# Patient Record
Sex: FEMALE | Race: WHITE | NOT HISPANIC OR LATINO | URBAN - METROPOLITAN AREA
[De-identification: names, ages, dates, MRNs, and addresses within clinical notes are randomized per-mention and may not be internally consistent; named-entity substitution may affect disease eponyms.]

---

## 2018-08-03 ENCOUNTER — EMERGENCY (EMERGENCY)
Facility: HOSPITAL | Age: 83
LOS: 0 days | Discharge: HOME | End: 2018-08-03
Attending: EMERGENCY MEDICINE | Admitting: EMERGENCY MEDICINE

## 2018-08-03 VITALS
DIASTOLIC BLOOD PRESSURE: 74 MMHG | SYSTOLIC BLOOD PRESSURE: 186 MMHG | TEMPERATURE: 99 F | RESPIRATION RATE: 18 BRPM | OXYGEN SATURATION: 99 % | HEART RATE: 74 BPM
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
## 2018-08-03 DIAGNOSIS — I10 ESSENTIAL (PRIMARY) HYPERTENSION: ICD-10-CM

## 2018-08-03 DIAGNOSIS — E11.649 TYPE 2 DIABETES MELLITUS WITH HYPOGLYCEMIA WITHOUT COMA: ICD-10-CM

## 2018-08-03 DIAGNOSIS — R47.81 SLURRED SPEECH: ICD-10-CM
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
## 2018-08-03 DIAGNOSIS — Z79.84 LONG TERM (CURRENT) USE OF ORAL HYPOGLYCEMIC DRUGS: ICD-10-CM
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
## 2018-08-03 DIAGNOSIS — E78.5 HYPERLIPIDEMIA, UNSPECIFIED: ICD-10-CM

## 2018-08-03 DIAGNOSIS — Z88.8 ALLERGY STATUS TO OTHER DRUGS, MEDICAMENTS AND BIOLOGICAL SUBSTANCES: ICD-10-CM

## 2018-08-03 DIAGNOSIS — S50.312A ABRASION OF LEFT ELBOW, INITIAL ENCOUNTER: ICD-10-CM

## 2018-08-03 DIAGNOSIS — I25.10 ATHEROSCLEROTIC HEART DISEASE OF NATIVE CORONARY ARTERY WITHOUT ANGINA PECTORIS: ICD-10-CM
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
## 2018-08-03 LAB
ALBUMIN SERPL ELPH-MCNC: 4.4 G/DL — SIGNIFICANT CHANGE UP (ref 3.5–5.2)
ALP SERPL-CCNC: 62 U/L — SIGNIFICANT CHANGE UP (ref 30–115)
ALT FLD-CCNC: 6 U/L — SIGNIFICANT CHANGE UP (ref 0–41)
ANION GAP SERPL CALC-SCNC: 23 MMOL/L — HIGH (ref 7–14)
AST SERPL-CCNC: 13 U/L — SIGNIFICANT CHANGE UP (ref 0–41)
BASOPHILS # BLD AUTO: 0.03 K/UL — SIGNIFICANT CHANGE UP (ref 0–0.2)
BASOPHILS NFR BLD AUTO: 0.4 % — SIGNIFICANT CHANGE UP (ref 0–1)
BILIRUB SERPL-MCNC: 0.3 MG/DL — SIGNIFICANT CHANGE UP (ref 0.2–1.2)
BUN SERPL-MCNC: 36 MG/DL — HIGH (ref 10–20)
CALCIUM SERPL-MCNC: 9.8 MG/DL — SIGNIFICANT CHANGE UP (ref 8.5–10.1)
CHLORIDE SERPL-SCNC: 94 MMOL/L — LOW (ref 98–110)
CO2 SERPL-SCNC: 21 MMOL/L — SIGNIFICANT CHANGE UP (ref 17–32)
CREAT SERPL-MCNC: 1.5 MG/DL — SIGNIFICANT CHANGE UP (ref 0.7–1.5)
EOSINOPHIL # BLD AUTO: 0.03 K/UL — SIGNIFICANT CHANGE UP (ref 0–0.7)
EOSINOPHIL NFR BLD AUTO: 0.4 % — SIGNIFICANT CHANGE UP (ref 0–8)
GAS PNL BLDV: SIGNIFICANT CHANGE UP
GLUCOSE SERPL-MCNC: 40 MG/DL — CRITICAL LOW (ref 70–99)
HCT VFR BLD CALC: 27.3 % — LOW (ref 37–47)
HGB BLD-MCNC: 9.2 G/DL — LOW (ref 12–16)
IMM GRANULOCYTES NFR BLD AUTO: 0.7 % — HIGH (ref 0.1–0.3)
LYMPHOCYTES # BLD AUTO: 1.35 K/UL — SIGNIFICANT CHANGE UP (ref 1.2–3.4)
LYMPHOCYTES # BLD AUTO: 18.2 % — LOW (ref 20.5–51.1)
MCHC RBC-ENTMCNC: 31.7 PG — HIGH (ref 27–31)
MCHC RBC-ENTMCNC: 33.7 G/DL — SIGNIFICANT CHANGE UP (ref 32–37)
MCV RBC AUTO: 94.1 FL — SIGNIFICANT CHANGE UP (ref 81–99)
MONOCYTES # BLD AUTO: 0.67 K/UL — HIGH (ref 0.1–0.6)
MONOCYTES NFR BLD AUTO: 9 % — SIGNIFICANT CHANGE UP (ref 1.7–9.3)
NEUTROPHILS # BLD AUTO: 5.29 K/UL — SIGNIFICANT CHANGE UP (ref 1.4–6.5)
NEUTROPHILS NFR BLD AUTO: 71.3 % — SIGNIFICANT CHANGE UP (ref 42.2–75.2)
PLATELET # BLD AUTO: 238 K/UL — SIGNIFICANT CHANGE UP (ref 130–400)
POTASSIUM SERPL-MCNC: 4.7 MMOL/L — SIGNIFICANT CHANGE UP (ref 3.5–5)
POTASSIUM SERPL-SCNC: 4.7 MMOL/L — SIGNIFICANT CHANGE UP (ref 3.5–5)
PROT SERPL-MCNC: 7.5 G/DL — SIGNIFICANT CHANGE UP (ref 6–8)
RBC # BLD: 2.9 M/UL — LOW (ref 4.2–5.4)
RBC # FLD: 13.3 % — SIGNIFICANT CHANGE UP (ref 11.5–14.5)
SODIUM SERPL-SCNC: 138 MMOL/L — SIGNIFICANT CHANGE UP (ref 135–146)
TROPONIN T SERPL-MCNC: <0.01 NG/ML — SIGNIFICANT CHANGE UP
WBC # BLD: 7.42 K/UL — SIGNIFICANT CHANGE UP (ref 4.8–10.8)
WBC # FLD AUTO: 7.42 K/UL — SIGNIFICANT CHANGE UP (ref 4.8–10.8)

## 2018-08-03 RX ORDER — NABUMETONE 750 MG
2 TABLET ORAL
Qty: 0 | Refills: 0 | COMMUNITY

## 2018-08-03 RX ORDER — METOPROLOL TARTRATE 50 MG
1 TABLET ORAL
Qty: 0 | Refills: 0 | COMMUNITY

## 2018-08-03 RX ORDER — METFORMIN HYDROCHLORIDE 850 MG/1
1 TABLET ORAL
Qty: 0 | Refills: 0 | COMMUNITY

## 2018-08-03 RX ORDER — PANTOPRAZOLE SODIUM 20 MG/1
1 TABLET, DELAYED RELEASE ORAL
Qty: 0 | Refills: 0 | COMMUNITY

## 2018-08-03 RX ORDER — FUROSEMIDE 40 MG
1 TABLET ORAL
Qty: 0 | Refills: 0 | COMMUNITY

## 2018-08-03 RX ORDER — RANOLAZINE 500 MG/1
1 TABLET, FILM COATED, EXTENDED RELEASE ORAL
Qty: 0 | Refills: 0 | COMMUNITY

## 2018-08-03 RX ORDER — GEMFIBROZIL 600 MG
1 TABLET ORAL
Qty: 0 | Refills: 0 | COMMUNITY

## 2018-08-03 NOTE — ED ADULT NURSE NOTE - NSIMPLEMENTINTERV_GEN_ALL_ED
Implemented All Fall with Harm Risk Interventions:  [place] to call system. Call bell, personal items and telephone within reach. Instruct patient to call for assistance. Room bathroom lighting operational. Non-slip footwear when patient is off stretcher. Physically safe environment: no spills, clutter or unnecessary equipment. Stretcher in lowest position, wheels locked, appropriate side rails in place. Provide visual cue, wrist band, yellow gown, etc. Monitor gait and stability. Monitor for mental status changes and reorient to person, place, and time. Review medications for side effects contributing to fall risk. Reinforce activity limits and safety measures with patient and family. Provide visual clues: red socks.

## 2018-08-03 NOTE — ED ADULT NURSE NOTE - CHIEF COMPLAINT QUOTE
slurred speech, s/p fall out of bed this am, patient on baby aspirin, as per family members patient usually speaks clearly without any deficits.

## 2018-08-03 NOTE — ED PROVIDER NOTE - NS ED MD DISPO DISCHARGE
Patient Information     Patient Name MRN Sex Cheri Roper [id number] Female 2004      Nursing Note by Isis Robles at 2017  2:00 PM     Author:  Isis Robles Service:  (none) Author Type:  (none)     Filed:  2017  2:24 PM Encounter Date:  2017 Status:  Signed     :  Isis Robles            Patient presents to clinic with rash on right ankle x 5 days.  Isis Meng ....................  2017   2:09 PM            
Home

## 2018-08-03 NOTE — ED ADULT TRIAGE NOTE - CHIEF COMPLAINT QUOTE
slurred speech, s/p fall out of bed this am, patient on baby aspirin slurred speech, s/p fall out of bed this am, patient on baby aspirin, as per family members patient usually speaks clearly without any deficits.

## 2018-08-03 NOTE — ED PROVIDER NOTE - PHYSICAL EXAMINATION
Vital signs reviewed  GENERAL: Patient well appearing, NAD  HEAD: NCAT  EYES: PERRL, EOMI  ENT: MMM  NECK: Supple, non tender  RESPIRATORY: Normal respiratory effort. CTA B/L. No wheezing, rales, rhonchi  CARDIOVASCULAR: Regular rate and rhythm. No murmurs, rubs or gallops.  ABDOMEN: Soft. Nondistended. Nontender. No guarding or rebound.   MUSCULOSKELETAL/EXTREMITIES: Brisk cap refill. 2+ radial pulses. No leg edema. Normal ROM of extremities.   SKIN:  Warm and dry. Left elbow abrasion.   NEURO: AAOx3. Slurred speech but coherent. Answering questions appropriately. No facial droop. Moving all extremities, 5/5 strength. No drift.   PSYCHIATRIC: Cooperative. Affect appropriate.

## 2018-08-03 NOTE — ED PROVIDER NOTE - ATTENDING CONTRIBUTION TO CARE
87 yo F dm, htn, cad, hld, now with episode of hypoglycemia and slurred speech, FS 50's, given d50 , resolved. episode of falling off the bed. mechanical. no syncope, no preceding symptoms. no cp/sob/abd pain/back pain/fever/sob.  vss, nontoxic, well appearing, elderly female, nl speech, PERRL, EOMI, GCS 15, pink conj, anicteric, MMM, tongue midline, no jvd, neck supple, CTAB, RRR, equal radial pulses bilat, abd soft/nt/nd, + ventral hernia, no cva tend. no calves tend, no edema, no fnd. + abrasion to left forearm, no bony tenderness, FROM, slow gait but no ataxia, neg rhomberg, no pronator drift, no cerebellar dysfunction.  labs, imaging, reassess.

## 2018-08-03 NOTE — ED ADULT NURSE NOTE - OBJECTIVE STATEMENT
Pt  heard loud thud when found pt wedged between bed and wall. Pt also had slurred speech at time and EMS was called. Pt now A&Ox4.  Pt fs was 52 on arrival. D50 amp given.

## 2018-08-03 NOTE — ED PROVIDER NOTE - PROGRESS NOTE DETAILS
symptoms gone post glucose administration. food. labs, imaging. pending ct results. FS repeated 150. ate in the ED> stable.

## 2018-08-03 NOTE — ED PROVIDER NOTE - OBJECTIVE STATEMENT
85yo F with PMHx DM, CAD, DLD, HTN, p/w falling out of bed this am and slurred speech, found to be hypoglycemic. Per family, they hear a thud in the bedroom this am and a minute later found the patient on the floor. Pt states she slipped getting out of bed, fell on left side, denies head trauma, currently no c/o pain. No LOC, not on AC. At that time family noticed pt was slightly slurring her speech and called EMS. Currently pt has no complaints. Denies fever, headache, lightheadedness, CP, SOB, cough, nausea, vomiting, abd pain, dysuria, leg swelling.

## 2019-07-24 ENCOUNTER — OUTPATIENT (OUTPATIENT)
Dept: OUTPATIENT SERVICES | Facility: HOSPITAL | Age: 84
LOS: 1 days | Discharge: HOME | End: 2019-07-24

## 2019-07-24 DIAGNOSIS — E10.9 TYPE 1 DIABETES MELLITUS WITHOUT COMPLICATIONS: ICD-10-CM

## 2019-07-24 DIAGNOSIS — R30.0 DYSURIA: ICD-10-CM

## 2019-07-24 PROBLEM — E11.9 TYPE 2 DIABETES MELLITUS WITHOUT COMPLICATIONS: Chronic | Status: ACTIVE | Noted: 2018-08-03

## 2019-07-24 PROBLEM — I25.10 ATHEROSCLEROTIC HEART DISEASE OF NATIVE CORONARY ARTERY WITHOUT ANGINA PECTORIS: Chronic | Status: ACTIVE | Noted: 2018-08-03

## 2019-07-24 PROBLEM — I10 ESSENTIAL (PRIMARY) HYPERTENSION: Chronic | Status: ACTIVE | Noted: 2018-08-03

## 2019-07-24 PROBLEM — E78.5 HYPERLIPIDEMIA, UNSPECIFIED: Chronic | Status: ACTIVE | Noted: 2018-08-03

## 2019-07-26 ENCOUNTER — OUTPATIENT (OUTPATIENT)
Dept: OUTPATIENT SERVICES | Facility: HOSPITAL | Age: 84
LOS: 1 days | Discharge: HOME | End: 2019-07-26

## 2019-07-26 DIAGNOSIS — R53.81 OTHER MALAISE: ICD-10-CM

## 2019-07-29 ENCOUNTER — OUTPATIENT (OUTPATIENT)
Dept: OUTPATIENT SERVICES | Facility: HOSPITAL | Age: 84
LOS: 1 days | Discharge: HOME | End: 2019-07-29

## 2019-07-29 DIAGNOSIS — D64.9 ANEMIA, UNSPECIFIED: ICD-10-CM

## 2019-08-02 PROBLEM — Z00.00 ENCOUNTER FOR PREVENTIVE HEALTH EXAMINATION: Status: ACTIVE | Noted: 2019-08-02

## 2019-08-14 ENCOUNTER — OUTPATIENT (OUTPATIENT)
Dept: OUTPATIENT SERVICES | Facility: HOSPITAL | Age: 84
LOS: 1 days | Discharge: HOME | End: 2019-08-14

## 2019-08-14 DIAGNOSIS — D50.9 IRON DEFICIENCY ANEMIA, UNSPECIFIED: ICD-10-CM

## 2019-08-20 ENCOUNTER — LABORATORY RESULT (OUTPATIENT)
Age: 84
End: 2019-08-20

## 2019-08-20 ENCOUNTER — OUTPATIENT (OUTPATIENT)
Dept: OUTPATIENT SERVICES | Facility: HOSPITAL | Age: 84
LOS: 1 days | Discharge: HOME | End: 2019-08-20

## 2019-08-20 ENCOUNTER — APPOINTMENT (OUTPATIENT)
Dept: HEMATOLOGY ONCOLOGY | Facility: CLINIC | Age: 84
End: 2019-08-20
Payer: MEDICARE

## 2019-08-20 VITALS
HEIGHT: 62 IN | DIASTOLIC BLOOD PRESSURE: 84 MMHG | TEMPERATURE: 96.2 F | HEART RATE: 57 BPM | BODY MASS INDEX: 34.96 KG/M2 | RESPIRATION RATE: 16 BRPM | SYSTOLIC BLOOD PRESSURE: 200 MMHG | WEIGHT: 190 LBS

## 2019-08-20 DIAGNOSIS — I10 ESSENTIAL (PRIMARY) HYPERTENSION: ICD-10-CM

## 2019-08-20 DIAGNOSIS — Z87.891 PERSONAL HISTORY OF NICOTINE DEPENDENCE: ICD-10-CM

## 2019-08-20 DIAGNOSIS — E78.5 HYPERLIPIDEMIA, UNSPECIFIED: ICD-10-CM

## 2019-08-20 DIAGNOSIS — K57.90 DIVERTICULOSIS OF INTESTINE, PART UNSPECIFIED, W/OUT PERFORATION OR ABSCESS W/OUT BLEEDING: ICD-10-CM

## 2019-08-20 DIAGNOSIS — R53.83 OTHER FATIGUE: ICD-10-CM

## 2019-08-20 DIAGNOSIS — E11.9 TYPE 2 DIABETES MELLITUS W/OUT COMPLICATIONS: ICD-10-CM

## 2019-08-20 DIAGNOSIS — Z85.828 PERSONAL HISTORY OF OTHER MALIGNANT NEOPLASM OF SKIN: ICD-10-CM

## 2019-08-20 DIAGNOSIS — Z80.0 FAMILY HISTORY OF MALIGNANT NEOPLASM OF DIGESTIVE ORGANS: ICD-10-CM

## 2019-08-20 LAB
HCT VFR BLD CALC: 26.9 %
HGB BLD-MCNC: 8.8 G/DL
MCHC RBC-ENTMCNC: 32.7 G/DL
MCHC RBC-ENTMCNC: 32.7 PG
MCV RBC AUTO: 100 FL
PLATELET # BLD AUTO: 154 K/UL
PMV BLD: 9.7 FL
RBC # BLD: 2.69 M/UL
RBC # FLD: 13.8 %
WBC # FLD AUTO: 4.91 K/UL

## 2019-08-20 PROCEDURE — 99203 OFFICE O/P NEW LOW 30 MIN: CPT

## 2019-08-20 RX ORDER — FUROSEMIDE 20 MG/1
20 TABLET ORAL
Refills: 0 | Status: ACTIVE | COMMUNITY

## 2019-08-20 RX ORDER — METOPROLOL TARTRATE 25 MG/1
25 TABLET, FILM COATED ORAL
Refills: 0 | Status: ACTIVE | COMMUNITY

## 2019-08-20 RX ORDER — DICLOFENAC SODIUM 1 %
1 KIT TOPICAL
Refills: 0 | Status: ACTIVE | COMMUNITY

## 2019-08-20 RX ORDER — PIOGLITAZONE HYDROCHLORIDE 15 MG/1
15 TABLET ORAL
Refills: 0 | Status: ACTIVE | COMMUNITY

## 2019-08-20 RX ORDER — ACETAMINOPHEN 325 MG/1
325 TABLET ORAL
Refills: 0 | Status: ACTIVE | COMMUNITY

## 2019-08-20 RX ORDER — CICLOPIROX 80 MG/ML
8 SOLUTION TOPICAL
Refills: 0 | Status: ACTIVE | COMMUNITY

## 2019-08-20 RX ORDER — NEOMYCIN/POLYMYXIN B/DEXAMETHA 3.5-10K-.1
0.35-10000-0.1 OINTMENT (GRAM) OPHTHALMIC (EYE)
Refills: 0 | Status: ACTIVE | COMMUNITY

## 2019-08-20 RX ORDER — GLIPIZIDE 10 MG/1
10 TABLET ORAL
Refills: 0 | Status: ACTIVE | COMMUNITY

## 2019-08-20 RX ORDER — FERROUS FUM/VIT C/B12-IF/FOLIC 110-0.5MG
CAPSULE ORAL
Refills: 0 | Status: DISCONTINUED | COMMUNITY

## 2019-08-20 RX ORDER — INSULIN LISPRO 100 [IU]/ML
INJECTION, SOLUTION INTRAVENOUS; SUBCUTANEOUS
Refills: 0 | Status: ACTIVE | COMMUNITY

## 2019-08-20 RX ORDER — RANOLAZINE 500 MG/1
500 TABLET, FILM COATED, EXTENDED RELEASE ORAL
Refills: 0 | Status: ACTIVE | COMMUNITY

## 2019-08-20 RX ORDER — NABUMETONE 500 MG/1
500 TABLET, FILM COATED ORAL
Refills: 0 | Status: ACTIVE | COMMUNITY

## 2019-08-20 RX ORDER — PRAVASTATIN SODIUM 40 MG/1
40 TABLET ORAL
Refills: 0 | Status: ACTIVE | COMMUNITY

## 2019-08-20 NOTE — PHYSICAL EXAM
[Ambulatory and capable of all self care but unable to carry out any work activities] : Status 2- Ambulatory and capable of all self care but unable to carry out any work activities. Up and about more than 50% of waking hours [Obese] : obese [Normal] : grossly intact

## 2019-08-20 NOTE — REASON FOR VISIT
[Initial Consultation] : an initial consultation for [Blood Count Assessment] : blood count assessment [Family Member] : family member

## 2019-08-21 LAB
HCYS SERPL-MCNC: 23.5 UMOL/L
PCA AB SER QL IF: NORMAL

## 2019-08-21 NOTE — ASSESSMENT
[FreeTextEntry1] : 87 year old female with macrocytic anemia. \par Normal WBC and platelet count\par Known b12 deficiency. Stopped supplementation last year. \par \par Recommendation\par Anemia could be  multifactorial - \par Nutritional deficiency, anemia of chronic disease vs bone marrow disoder such as MDS. \par B12 is 582. Will obtain MMA and homocysteine levels. If high will start supplementation with B12 injections. \par Will get parietal cell Ab and intrinsic factor ab. \par Colonoscopy was done in last 5 years was normal. Had Endoscopy as well but not sure of results. \par Since no symptoms of fatigue, chest pain, sob, palpitations, will not recommend prbc at this time\par \par RTC in one week to discuss results

## 2019-08-21 NOTE — HISTORY OF PRESENT ILLNESS
[de-identified] : 87 year old female with chronic kidney disease secondary to diabetes and HTN referred from [facility] for macrocytic anemia. \par She has been told she had anemia for last 5-8 years and was getting B12 injections. These were stopped last year and she does not recall why. \par She is NH for Physical Therapy. \par She feels well. Denies fatigue, SOB, chest pain, head ache, melena, bleeding per rectum. \par She had colonoscopy within 5 years and it was normal. \par lab work is as follows.\par \par CBC here showed HB of 8.8 with MCV of 100. \par B12 -580, Folate - 18.\par Iron studies -\par ferritin - 57, iron saturation -20%, TIBC -357.

## 2019-08-23 LAB
GASTRIN SERPL-MCNC: 20 PG/ML
IF BLOCK AB SER QL: NORMAL

## 2019-08-26 LAB — METHYLMALONATE SERPL-SCNC: 314 NMOL/L

## 2019-08-28 ENCOUNTER — APPOINTMENT (OUTPATIENT)
Dept: HEMATOLOGY ONCOLOGY | Facility: CLINIC | Age: 84
End: 2019-08-28
Payer: MEDICARE

## 2019-08-28 VITALS
BODY MASS INDEX: 34.6 KG/M2 | WEIGHT: 188 LBS | RESPIRATION RATE: 16 BRPM | HEIGHT: 62 IN | TEMPERATURE: 96.3 F | SYSTOLIC BLOOD PRESSURE: 180 MMHG | DIASTOLIC BLOOD PRESSURE: 78 MMHG | HEART RATE: 56 BPM

## 2019-08-28 PROCEDURE — 99213 OFFICE O/P EST LOW 20 MIN: CPT

## 2019-08-28 NOTE — HISTORY OF PRESENT ILLNESS
[de-identified] : 87 year old female with chronic kidney disease secondary to diabetes and HTN referred from [facility] for macrocytic anemia. \par She has been told she had anemia for last 5-8 years and was getting B12 injections. These were stopped last year and she does not recall why. \par She is [facility] for Physical Therapy. \par She feels well. Denies fatigue, SOB, chest pain, head ache, melena, bleeding per rectum. \par She had colonoscopy within 5 years and it was normal. \par lab work is as follows.\par \par CBC here showed HB of 8.8 with MCV of 100. \par B12 -580, Folate - 18.\par Iron studies -\par ferritin - 57, iron saturation -20%, TIBC -357.

## 2019-08-28 NOTE — REASON FOR VISIT
[Initial Consultation] : an initial consultation for [Family Member] : family member [Blood Count Assessment] : blood count assessment

## 2019-08-28 NOTE — CONSULT LETTER
[Dear  ___] : Dear  [unfilled], [Consult Letter:] : I had the pleasure of evaluating your patient, [unfilled]. [Please see my note below.] : Please see my note below. [Consult Closing:] : Thank you very much for allowing me to participate in the care of this patient.  If you have any questions, please do not hesitate to contact me. [Sincerely,] : Sincerely, [FreeTextEntry2] : Erum Aldana DO\par \par  [address], [place] [address]\par Phone: [phone]\par  [FreeTextEntry3] : Bill Muller DO\par Attending Physician,\par Hematology/ Medical Oncology\par [phone] office\par \par

## 2019-08-28 NOTE — ASSESSMENT
[FreeTextEntry1] : 87 year old female with macrocytic anemia. \par Normal WBC and platelet count\par Known b12 deficiency. Stopped supplementation last year. \par \par Recommendation\par Anemia could be  multifactorial - \par Homocystine is elevated and MMA is normal suggesting folic acid deficiency\par \par No objective evidence of low iron; unless NH records indicate low iron, recommend to stop oral iron\par \par Colonoscopy was done in last 5 years was normal. Had Endoscopy as well but not sure of results. \par Since no symptoms of fatigue, chest pain, sob, palpitations, will not recommend prbc at this time\par \par RTC in 6 weeks ; labs before the visit

## 2019-08-30 ENCOUNTER — OUTPATIENT (OUTPATIENT)
Dept: OUTPATIENT SERVICES | Facility: HOSPITAL | Age: 84
LOS: 1 days | Discharge: HOME | End: 2019-08-30

## 2019-08-30 DIAGNOSIS — R79.9 ABNORMAL FINDING OF BLOOD CHEMISTRY, UNSPECIFIED: ICD-10-CM

## 2019-09-17 ENCOUNTER — OUTPATIENT (OUTPATIENT)
Dept: OUTPATIENT SERVICES | Facility: HOSPITAL | Age: 84
LOS: 1 days | Discharge: HOME | End: 2019-09-17

## 2019-09-18 DIAGNOSIS — D64.9 ANEMIA, UNSPECIFIED: ICD-10-CM

## 2019-09-19 ENCOUNTER — APPOINTMENT (OUTPATIENT)
Dept: INFUSION THERAPY | Facility: CLINIC | Age: 84
End: 2019-09-19

## 2019-09-19 ENCOUNTER — OUTPATIENT (OUTPATIENT)
Dept: OUTPATIENT SERVICES | Facility: HOSPITAL | Age: 84
LOS: 1 days | Discharge: HOME | End: 2019-09-19

## 2019-09-19 DIAGNOSIS — R53.81 OTHER MALAISE: ICD-10-CM

## 2019-10-04 ENCOUNTER — OUTPATIENT (OUTPATIENT)
Dept: OUTPATIENT SERVICES | Facility: HOSPITAL | Age: 84
LOS: 1 days | Discharge: HOME | End: 2019-10-04

## 2019-10-05 DIAGNOSIS — R53.81 OTHER MALAISE: ICD-10-CM

## 2019-10-05 DIAGNOSIS — D64.9 ANEMIA, UNSPECIFIED: ICD-10-CM

## 2019-10-07 ENCOUNTER — OUTPATIENT (OUTPATIENT)
Dept: OUTPATIENT SERVICES | Facility: HOSPITAL | Age: 84
LOS: 1 days | Discharge: HOME | End: 2019-10-07

## 2019-10-07 DIAGNOSIS — R79.9 ABNORMAL FINDING OF BLOOD CHEMISTRY, UNSPECIFIED: ICD-10-CM

## 2019-10-08 ENCOUNTER — APPOINTMENT (OUTPATIENT)
Dept: HEMATOLOGY ONCOLOGY | Facility: CLINIC | Age: 84
End: 2019-10-08
Payer: MEDICARE

## 2019-10-08 ENCOUNTER — OUTPATIENT (OUTPATIENT)
Dept: OUTPATIENT SERVICES | Facility: HOSPITAL | Age: 84
LOS: 1 days | Discharge: HOME | End: 2019-10-08

## 2019-10-08 VITALS
WEIGHT: 183 LBS | HEIGHT: 62 IN | DIASTOLIC BLOOD PRESSURE: 70 MMHG | SYSTOLIC BLOOD PRESSURE: 149 MMHG | BODY MASS INDEX: 33.68 KG/M2 | TEMPERATURE: 96 F | HEART RATE: 55 BPM | RESPIRATION RATE: 16 BRPM

## 2019-10-08 DIAGNOSIS — D64.9 ANEMIA, UNSPECIFIED: ICD-10-CM

## 2019-10-08 PROCEDURE — 99213 OFFICE O/P EST LOW 20 MIN: CPT

## 2019-10-08 NOTE — CONSULT LETTER
[Dear  ___] : Dear  [unfilled], [Consult Letter:] : I had the pleasure of evaluating your patient, [unfilled]. [Please see my note below.] : Please see my note below. [Consult Closing:] : Thank you very much for allowing me to participate in the care of this patient.  If you have any questions, please do not hesitate to contact me. [Sincerely,] : Sincerely, [FreeTextEntry2] : Erum Aldana DO\par \par  [address], [place] [address]\par Phone: [phone]\par  [FreeTextEntry3] : Bill Muller DO\par Attending Physician,\par Hematology/ Medical Oncology\par [phone] office\par \par

## 2019-10-08 NOTE — HISTORY OF PRESENT ILLNESS
[de-identified] : 87 year old female with chronic kidney disease secondary to diabetes and HTN referred from [facility] for macrocytic anemia. \par She has been told she had anemia for last 5-8 years and was getting B12 injections. These were stopped last year and she does not recall why. \par She is NH for Physical Therapy. \par She feels well. Denies fatigue, SOB, chest pain, head ache, melena, bleeding per rectum. \par She had colonoscopy within 5 years and it was normal. \par lab work is as follows.\par \par CBC here showed HB of 8.8 with MCV of 100. \par B12 -580, Folate - 18.\par Iron studies -\par ferritin - 57, iron saturation -20%, TIBC -357.  [de-identified] : 10/8/2019: The patient is here for follow up on her anemia. Her last blood work from 10/4 showed a hb of 8.5 with MCV of 103.8/ Retic count 3% = 78.9/ WBC 3.8 with normal differential / EPO 12.8 . She has no complaints at this point except for fatigue\par

## 2019-10-08 NOTE — ASSESSMENT
[FreeTextEntry1] : # Macrocytic anemia with mild leukopenia . Retic count not adequately high\par - Known b12 deficiency from before,  on vitamin b12 oral\par - Homocystine is elevated and MMA is normal suggesting folic acid deficiency. on oral replacement\par - No evidence of MARIBELL on our labs . No need for oral iron. \par - Her anemia could be from her CKD . EPO level 12.8 . \par   Will give EPO [id number] weekly for 5 weeks and recheck her labs\par   If no response obtained, will consider the diagnosis of MDS . \par \par RTC in 2 months \par \par The patient was seen and examined with Dr Muller

## 2019-10-11 ENCOUNTER — APPOINTMENT (OUTPATIENT)
Dept: INFUSION THERAPY | Facility: CLINIC | Age: 84
End: 2019-10-11

## 2019-10-11 ENCOUNTER — OUTPATIENT (OUTPATIENT)
Dept: OUTPATIENT SERVICES | Facility: HOSPITAL | Age: 84
LOS: 1 days | Discharge: HOME | End: 2019-10-11

## 2019-10-11 DIAGNOSIS — R79.9 ABNORMAL FINDING OF BLOOD CHEMISTRY, UNSPECIFIED: ICD-10-CM

## 2019-10-17 ENCOUNTER — OUTPATIENT (OUTPATIENT)
Dept: OUTPATIENT SERVICES | Facility: HOSPITAL | Age: 84
LOS: 1 days | Discharge: HOME | End: 2019-10-17

## 2019-10-17 DIAGNOSIS — K74.60 UNSPECIFIED CIRRHOSIS OF LIVER: ICD-10-CM

## 2019-10-24 ENCOUNTER — OUTPATIENT (OUTPATIENT)
Dept: OUTPATIENT SERVICES | Facility: HOSPITAL | Age: 84
LOS: 1 days | Discharge: HOME | End: 2019-10-24

## 2019-10-24 DIAGNOSIS — D64.9 ANEMIA, UNSPECIFIED: ICD-10-CM

## 2019-10-26 ENCOUNTER — OUTPATIENT (OUTPATIENT)
Dept: OUTPATIENT SERVICES | Facility: HOSPITAL | Age: 84
LOS: 1 days | Discharge: HOME | End: 2019-10-26

## 2019-10-26 DIAGNOSIS — R10.9 UNSPECIFIED ABDOMINAL PAIN: ICD-10-CM

## 2019-10-31 ENCOUNTER — OUTPATIENT (OUTPATIENT)
Dept: OUTPATIENT SERVICES | Facility: HOSPITAL | Age: 84
LOS: 1 days | Discharge: HOME | End: 2019-10-31

## 2019-10-31 DIAGNOSIS — D64.9 ANEMIA, UNSPECIFIED: ICD-10-CM

## 2019-11-04 ENCOUNTER — OUTPATIENT (OUTPATIENT)
Dept: OUTPATIENT SERVICES | Facility: HOSPITAL | Age: 84
LOS: 1 days | Discharge: HOME | End: 2019-11-04

## 2019-11-04 DIAGNOSIS — R53.83 OTHER FATIGUE: ICD-10-CM

## 2019-12-27 ENCOUNTER — OUTPATIENT (OUTPATIENT)
Dept: OUTPATIENT SERVICES | Facility: HOSPITAL | Age: 84
LOS: 1 days | Discharge: HOME | End: 2019-12-27

## 2019-12-27 DIAGNOSIS — D64.9 ANEMIA, UNSPECIFIED: ICD-10-CM

## 2020-01-28 ENCOUNTER — OUTPATIENT (OUTPATIENT)
Dept: OUTPATIENT SERVICES | Facility: HOSPITAL | Age: 85
LOS: 1 days | Discharge: HOME | End: 2020-01-28

## 2020-01-28 ENCOUNTER — LABORATORY RESULT (OUTPATIENT)
Age: 85
End: 2020-01-28

## 2020-01-28 ENCOUNTER — APPOINTMENT (OUTPATIENT)
Dept: HEMATOLOGY ONCOLOGY | Facility: CLINIC | Age: 85
End: 2020-01-28
Payer: MEDICARE

## 2020-01-28 VITALS
RESPIRATION RATE: 16 BRPM | HEART RATE: 57 BPM | DIASTOLIC BLOOD PRESSURE: 72 MMHG | HEIGHT: 62 IN | TEMPERATURE: 97.4 F | SYSTOLIC BLOOD PRESSURE: 142 MMHG | BODY MASS INDEX: 32.94 KG/M2 | WEIGHT: 179 LBS

## 2020-01-28 LAB
HCT VFR BLD CALC: 29.3 %
HGB BLD-MCNC: 9.7 G/DL
MCHC RBC-ENTMCNC: 32.1 PG
MCHC RBC-ENTMCNC: 33.1 G/DL
MCV RBC AUTO: 97 FL
PLATELET # BLD AUTO: 173 K/UL
PMV BLD: 11.1 FL
RBC # BLD: 3.02 M/UL
RBC # FLD: 14.4 %
WBC # FLD AUTO: 5.29 K/UL

## 2020-01-28 PROCEDURE — 99213 OFFICE O/P EST LOW 20 MIN: CPT

## 2020-01-28 NOTE — ASSESSMENT
[FreeTextEntry1] : # Macrocytic anemia with mild leukopenia . Retic count not adequately high\par - Known b12 deficiency from before,  on vitamin b12 oral\par - Homocystine is elevated and MMA is normal suggesting folic acid deficiency. on oral replacement\par - No evidence of MARIBELL on our labs . No need for oral iron. \par - Her anemia could be from her CKD . EPO level 12.8 . \par   Will give EPO [id number] weekly for 5 weeks and recheck her labs\par \par labs today\par good response to procrit; then decfreased w/o procrit\par restart procrit 10,000 x5 and rtc after that

## 2020-01-28 NOTE — CONSULT LETTER
[Dear  ___] : Dear  [unfilled], [Consult Letter:] : I had the pleasure of evaluating your patient, [unfilled]. [Please see my note below.] : Please see my note below. [Consult Closing:] : Thank you very much for allowing me to participate in the care of this patient.  If you have any questions, please do not hesitate to contact me. [Sincerely,] : Sincerely, [FreeTextEntry2] : Erum Aldana DO\par \par  [address], [place] [address]\par Phone: [phone]\par  [FreeTextEntry3] : Bill Muller DO\par Attending Physician,\par Hematology/ Medical Oncology\par [phone] office\par \par

## 2020-01-31 DIAGNOSIS — D64.9 ANEMIA, UNSPECIFIED: ICD-10-CM

## 2020-06-23 ENCOUNTER — APPOINTMENT (OUTPATIENT)
Dept: HEMATOLOGY ONCOLOGY | Facility: CLINIC | Age: 85
End: 2020-06-23
Payer: MEDICARE

## 2020-06-23 VITALS
TEMPERATURE: 98.7 F | SYSTOLIC BLOOD PRESSURE: 151 MMHG | DIASTOLIC BLOOD PRESSURE: 62 MMHG | RESPIRATION RATE: 14 BRPM | HEART RATE: 54 BPM | HEIGHT: 62 IN | BODY MASS INDEX: 31.83 KG/M2 | WEIGHT: 173 LBS

## 2020-06-23 PROCEDURE — 99213 OFFICE O/P EST LOW 20 MIN: CPT

## 2020-06-23 NOTE — HISTORY OF PRESENT ILLNESS
[de-identified] : 10/8/2019: The patient is here for follow up on her anemia. Her last blood work from 10/4 showed a hb of 8.5 with MCV of 103.8/ Retic count 3% = 78.9/ WBC 3.8 with normal differential / EPO 12.8 . She has no complaints at this point except for fatigue\par \par 6/23/2020: The patient is here for follow up . SHe has no major complaints. She is on vitamin B12 and folate, and procrit 59308 units weekly . Her Hb 9.4 with MCV of 105 on 6/16 [de-identified] : 87 year old female with chronic kidney disease secondary to diabetes and HTN referred from [facility] for macrocytic anemia. \par She has been told she had anemia for last 5-8 years and was getting B12 injections. These were stopped last year and she does not recall why. \par She is NH for Physical Therapy. \par She feels well. Denies fatigue, SOB, chest pain, head ache, melena, bleeding per rectum. \par She had colonoscopy within 5 years and it was normal. \par lab work is as follows.\par \par CBC here showed HB of 8.8 with MCV of 100. \par B12 -580, Folate - 18.\par Iron studies -\par ferritin - 57, iron saturation -20%, TIBC -357.

## 2020-06-23 NOTE — PHYSICAL EXAM
[Obese] : obese [Ambulatory and capable of all self care but unable to carry out any work activities] : Status 2- Ambulatory and capable of all self care but unable to carry out any work activities. Up and about more than 50% of waking hours [Normal] : grossly intact

## 2020-06-23 NOTE — ASSESSMENT
[FreeTextEntry1] : # Macrocytic anemia Hb 9.4 with \par - Known b12 deficiency from before,  on vitamin b12 oral. Vitamin B12 normal 6/16/20\par - Homocystine is elevated and MMA is normal suggesting folic acid deficiency. on oral replacement. Folate level normal on 6/16\par - No evidence of MARIBELL on our labs . No need for oral iron. \par - Her anemia could be from her CKD . EPO level 12.8 . on procrit 67400 units weekly for the last week \par - Could be MDS . We discussed it with daughter ,  bone marrow biopsy will be deferred at this point, since her Hb is stable , and there is no transfusion requirements \par \par Plan: \par C/w procrit , target Hb of 10\par \par RTC in 2 months with labs\par

## 2020-06-23 NOTE — REASON FOR VISIT
[Blood Count Assessment] : blood count assessment [Family Member] : family member [Initial Consultation] : an initial consultation for

## 2020-06-23 NOTE — CONSULT LETTER
[Consult Letter:] : I had the pleasure of evaluating your patient, [unfilled]. [Dear  ___] : Dear  [unfilled], [Sincerely,] : Sincerely, [Consult Closing:] : Thank you very much for allowing me to participate in the care of this patient.  If you have any questions, please do not hesitate to contact me. [Please see my note below.] : Please see my note below. [FreeTextEntry3] : Bill Muller DO\par Attending Physician,\par Hematology/ Medical Oncology\par [phone] office\par \par  [FreeTextEntry2] : Erum Aldana DO\par \par  [address], [place] [address]\par Phone: [phone]\par

## 2020-09-23 ENCOUNTER — OUTPATIENT (OUTPATIENT)
Dept: OUTPATIENT SERVICES | Facility: HOSPITAL | Age: 85
LOS: 1 days | Discharge: HOME | End: 2020-09-23

## 2020-09-23 ENCOUNTER — LABORATORY RESULT (OUTPATIENT)
Age: 85
End: 2020-09-23

## 2020-09-23 ENCOUNTER — APPOINTMENT (OUTPATIENT)
Dept: HEMATOLOGY ONCOLOGY | Facility: CLINIC | Age: 85
End: 2020-09-23
Payer: MEDICARE

## 2020-09-23 VITALS
BODY MASS INDEX: 32.2 KG/M2 | SYSTOLIC BLOOD PRESSURE: 194 MMHG | RESPIRATION RATE: 14 BRPM | DIASTOLIC BLOOD PRESSURE: 77 MMHG | TEMPERATURE: 97.3 F | HEIGHT: 62 IN | WEIGHT: 175 LBS | HEART RATE: 61 BPM

## 2020-09-23 DIAGNOSIS — D64.9 ANEMIA, UNSPECIFIED: ICD-10-CM

## 2020-09-23 PROCEDURE — 99213 OFFICE O/P EST LOW 20 MIN: CPT

## 2020-09-23 NOTE — REASON FOR VISIT
[Blood Count Assessment] : blood count assessment [Family Member] : family member [Follow-Up Visit] : a follow-up visit for

## 2020-09-23 NOTE — HISTORY OF PRESENT ILLNESS
[de-identified] : 87 year old female with chronic kidney disease secondary to diabetes and HTN referred from [facility] for macrocytic anemia. \par She has been told she had anemia for last 5-8 years and was getting B12 injections. These were stopped last year and she does not recall why. \par She is NH for Physical Therapy. \par She feels well. Denies fatigue, SOB, chest pain, head ache, melena, bleeding per rectum. \par She had colonoscopy within 5 years and it was normal. \par lab work is as follows.\par \par CBC here showed HB of 8.8 with MCV of 100. \par B12 -580, Folate - 18.\par Iron studies -\par ferritin - 57, iron saturation -20%, TIBC -357.  [de-identified] : 10/8/2019: The patient is here for follow up on her anemia. Her last blood work from 10/4 showed a hb of 8.5 with MCV of 103.8/ Retic count 3% = 78.9/ WBC 3.8 with normal differential / EPO 12.8 . She has no complaints at this point except for fatigue\par \par 6/23/2020: The patient is here for follow up . SHe has no major complaints. She is on vitamin B12 and folate, and procrit 06111 units weekly . Her Hb 9.4 with MCV of 105 on 6/16\par \par 9/23/2020\par Patient is here for a follow-up visit for anemia, accompanied by her daughter.  She is feeling well with no new complaints.  Patient denies fever, chills, CP, dizziness nausea, vomiting, new pain or bleeding.  She is receiving Retacrit injections and B12 at [facility], as per daughter.  She ambulates with walker at facility.  There are no results available for review today, however they note that the labwork is closely monitored at the institution.

## 2020-09-23 NOTE — ASSESSMENT
[FreeTextEntry1] : # Macrocytic anemia Hb 9.4 with \par - Known b12 deficiency from before, on vitamin b12 oral. Vitamin B12 normal 6/16/20\par - Homocystine is elevated and MMA is normal suggesting folic acid deficiency. on oral replacement. Folate level normal on 6/16\par - No evidence of MARIBELL on our labs . No need for oral iron. \par - Her anemia could be from her CKD . EPO level 12.8 ; c/w procrit 49111 units weekly for the last week \par - Could be MDS . We discussed it with daughter ,  bone marrow biopsy will be deferred at this point, since her Hb is stable , and there is no transfusion requirements \par \par Plan: \par - CBC, BMP, LFTs, iron studies, ferritin today\par - C/w Retacrit 10,000 units SQ weekly, target Hb of 10g/dL\par \par RTC in 6 months with labs

## 2020-09-23 NOTE — CONSULT LETTER
[Dear  ___] : Dear  [unfilled], [Consult Letter:] : I had the pleasure of evaluating your patient, [unfilled]. [Please see my note below.] : Please see my note below. [Consult Closing:] : Thank you very much for allowing me to participate in the care of this patient.  If you have any questions, please do not hesitate to contact me. [Sincerely,] : Sincerely, [FreeTextEntry2] : Erum Aldana DO\par \par  [address], [place] [address]\par Phone: [phone]\par  [FreeTextEntry3] : Bill Muller DO\par Attending Physician,\par Hematology/ Medical Oncology\par [phone] office\par \par

## 2020-09-24 LAB
ALBUMIN SERPL ELPH-MCNC: 4.3 G/DL
ALP BLD-CCNC: 93 U/L
ALT SERPL-CCNC: 10 U/L
ANION GAP SERPL CALC-SCNC: 16 MMOL/L
AST SERPL-CCNC: 15 U/L
BILIRUB DIRECT SERPL-MCNC: <0.2 MG/DL
BILIRUB INDIRECT SERPL-MCNC: >0.2 MG/DL
BILIRUB SERPL-MCNC: 0.4 MG/DL
BUN SERPL-MCNC: 38 MG/DL
CALCIUM SERPL-MCNC: 9.5 MG/DL
CHLORIDE SERPL-SCNC: 101 MMOL/L
CO2 SERPL-SCNC: 20 MMOL/L
CREAT SERPL-MCNC: 1.4 MG/DL
FERRITIN SERPL-MCNC: 108 NG/ML
GLUCOSE SERPL-MCNC: 153 MG/DL
HCT VFR BLD CALC: 38.1 %
HGB BLD-MCNC: 12.1 G/DL
IRON SATN MFR SERPL: 12 %
IRON SERPL-MCNC: 48 UG/DL
MCHC RBC-ENTMCNC: 31.3 PG
MCHC RBC-ENTMCNC: 31.8 G/DL
MCV RBC AUTO: 98.4 FL
PLATELET # BLD AUTO: 206 K/UL
PMV BLD: 9.9 FL
POTASSIUM SERPL-SCNC: 4.9 MMOL/L
PROT SERPL-MCNC: 7.1 G/DL
RBC # BLD: 3.87 M/UL
RBC # FLD: 14.2 %
SODIUM SERPL-SCNC: 137 MMOL/L
TIBC SERPL-MCNC: 388 UG/DL
UIBC SERPL-MCNC: 340 UG/DL
WBC # FLD AUTO: 6.47 K/UL

## 2021-04-28 ENCOUNTER — APPOINTMENT (OUTPATIENT)
Dept: HEMATOLOGY ONCOLOGY | Facility: CLINIC | Age: 86
End: 2021-04-28

## 2022-11-10 ENCOUNTER — INPATIENT (INPATIENT)
Facility: HOSPITAL | Age: 87
LOS: 4 days | Discharge: NURSING, REHAB OR LONG-TERM CARE | End: 2022-11-15
Attending: STUDENT IN AN ORGANIZED HEALTH CARE EDUCATION/TRAINING PROGRAM | Admitting: STUDENT IN AN ORGANIZED HEALTH CARE EDUCATION/TRAINING PROGRAM

## 2022-11-10 VITALS
HEART RATE: 60 BPM | RESPIRATION RATE: 18 BRPM | TEMPERATURE: 98 F | SYSTOLIC BLOOD PRESSURE: 187 MMHG | DIASTOLIC BLOOD PRESSURE: 77 MMHG | OXYGEN SATURATION: 100 %

## 2022-11-10 LAB
ALBUMIN SERPL ELPH-MCNC: 4.2 G/DL — SIGNIFICANT CHANGE UP (ref 3.5–5.2)
ALP SERPL-CCNC: 84 U/L — SIGNIFICANT CHANGE UP (ref 30–115)
ALT FLD-CCNC: 9 U/L — SIGNIFICANT CHANGE UP (ref 0–41)
ANION GAP SERPL CALC-SCNC: 12 MMOL/L — SIGNIFICANT CHANGE UP (ref 7–14)
APPEARANCE UR: CLEAR — SIGNIFICANT CHANGE UP
APTT BLD: 30.4 SEC — SIGNIFICANT CHANGE UP (ref 27–39.2)
AST SERPL-CCNC: 19 U/L — SIGNIFICANT CHANGE UP (ref 0–41)
BASOPHILS # BLD AUTO: 0.04 K/UL — SIGNIFICANT CHANGE UP (ref 0–0.2)
BASOPHILS NFR BLD AUTO: 0.6 % — SIGNIFICANT CHANGE UP (ref 0–1)
BILIRUB SERPL-MCNC: 0.2 MG/DL — SIGNIFICANT CHANGE UP (ref 0.2–1.2)
BILIRUB UR-MCNC: NEGATIVE — SIGNIFICANT CHANGE UP
BLD GP AB SCN SERPL QL: SIGNIFICANT CHANGE UP
BUN SERPL-MCNC: 34 MG/DL — HIGH (ref 10–20)
CALCIUM SERPL-MCNC: 9 MG/DL — SIGNIFICANT CHANGE UP (ref 8.4–10.5)
CHLORIDE SERPL-SCNC: 99 MMOL/L — SIGNIFICANT CHANGE UP (ref 98–110)
CO2 SERPL-SCNC: 24 MMOL/L — SIGNIFICANT CHANGE UP (ref 17–32)
COLOR SPEC: SIGNIFICANT CHANGE UP
CREAT SERPL-MCNC: 1.2 MG/DL — SIGNIFICANT CHANGE UP (ref 0.7–1.5)
DIFF PNL FLD: ABNORMAL
EGFR: 43 ML/MIN/1.73M2 — LOW
EOSINOPHIL # BLD AUTO: 0.08 K/UL — SIGNIFICANT CHANGE UP (ref 0–0.7)
EOSINOPHIL NFR BLD AUTO: 1.2 % — SIGNIFICANT CHANGE UP (ref 0–8)
ETHANOL SERPL-MCNC: <10 MG/DL — SIGNIFICANT CHANGE UP
GLUCOSE SERPL-MCNC: 246 MG/DL — HIGH (ref 70–99)
GLUCOSE UR QL: NEGATIVE — SIGNIFICANT CHANGE UP
HCT VFR BLD CALC: 36.9 % — LOW (ref 37–47)
HGB BLD-MCNC: 12.1 G/DL — SIGNIFICANT CHANGE UP (ref 12–16)
IMM GRANULOCYTES NFR BLD AUTO: 1.6 % — HIGH (ref 0.1–0.3)
INR BLD: 1.06 RATIO — SIGNIFICANT CHANGE UP (ref 0.65–1.3)
KETONES UR-MCNC: NEGATIVE — SIGNIFICANT CHANGE UP
LACTATE SERPL-SCNC: 1.5 MMOL/L — SIGNIFICANT CHANGE UP (ref 0.7–2)
LEUKOCYTE ESTERASE UR-ACNC: ABNORMAL
LIDOCAIN IGE QN: 53 U/L — SIGNIFICANT CHANGE UP (ref 7–60)
LYMPHOCYTES # BLD AUTO: 0.93 K/UL — LOW (ref 1.2–3.4)
LYMPHOCYTES # BLD AUTO: 13.8 % — LOW (ref 20.5–51.1)
MCHC RBC-ENTMCNC: 32.8 G/DL — SIGNIFICANT CHANGE UP (ref 32–37)
MCHC RBC-ENTMCNC: 32.9 PG — HIGH (ref 27–31)
MCV RBC AUTO: 100.3 FL — HIGH (ref 81–99)
MONOCYTES # BLD AUTO: 0.63 K/UL — HIGH (ref 0.1–0.6)
MONOCYTES NFR BLD AUTO: 9.3 % — SIGNIFICANT CHANGE UP (ref 1.7–9.3)
NEUTROPHILS # BLD AUTO: 4.96 K/UL — SIGNIFICANT CHANGE UP (ref 1.4–6.5)
NEUTROPHILS NFR BLD AUTO: 73.5 % — SIGNIFICANT CHANGE UP (ref 42.2–75.2)
NITRITE UR-MCNC: NEGATIVE — SIGNIFICANT CHANGE UP
NRBC # BLD: 0 /100 WBCS — SIGNIFICANT CHANGE UP (ref 0–0)
PH UR: 7 — SIGNIFICANT CHANGE UP (ref 5–8)
PLATELET # BLD AUTO: 172 K/UL — SIGNIFICANT CHANGE UP (ref 130–400)
POTASSIUM SERPL-MCNC: 4.7 MMOL/L — SIGNIFICANT CHANGE UP (ref 3.5–5)
POTASSIUM SERPL-SCNC: 4.7 MMOL/L — SIGNIFICANT CHANGE UP (ref 3.5–5)
PROT SERPL-MCNC: 6.9 G/DL — SIGNIFICANT CHANGE UP (ref 6–8)
PROT UR-MCNC: ABNORMAL
PROTHROM AB SERPL-ACNC: 12.1 SEC — SIGNIFICANT CHANGE UP (ref 9.95–12.87)
RBC # BLD: 3.68 M/UL — LOW (ref 4.2–5.4)
RBC # FLD: 13.7 % — SIGNIFICANT CHANGE UP (ref 11.5–14.5)
SODIUM SERPL-SCNC: 135 MMOL/L — SIGNIFICANT CHANGE UP (ref 135–146)
SP GR SPEC: 1.03 — SIGNIFICANT CHANGE UP (ref 1.01–1.03)
UROBILINOGEN FLD QL: SIGNIFICANT CHANGE UP
WBC # BLD: 6.75 K/UL — SIGNIFICANT CHANGE UP (ref 4.8–10.8)
WBC # FLD AUTO: 6.75 K/UL — SIGNIFICANT CHANGE UP (ref 4.8–10.8)

## 2022-11-10 PROCEDURE — 71045 X-RAY EXAM CHEST 1 VIEW: CPT | Mod: 26

## 2022-11-10 PROCEDURE — 73564 X-RAY EXAM KNEE 4 OR MORE: CPT | Mod: 26,RT

## 2022-11-10 PROCEDURE — 74177 CT ABD & PELVIS W/CONTRAST: CPT | Mod: 26,MA

## 2022-11-10 PROCEDURE — 64447 NJX AA&/STRD FEMORAL NRV IMG: CPT

## 2022-11-10 PROCEDURE — 71260 CT THORAX DX C+: CPT | Mod: 26,MA

## 2022-11-10 PROCEDURE — 70450 CT HEAD/BRAIN W/O DYE: CPT | Mod: 26,MA

## 2022-11-10 PROCEDURE — 72125 CT NECK SPINE W/O DYE: CPT | Mod: 26,MA

## 2022-11-10 PROCEDURE — 99285 EMERGENCY DEPT VISIT HI MDM: CPT | Mod: 25

## 2022-11-10 PROCEDURE — 73552 X-RAY EXAM OF FEMUR 2/>: CPT | Mod: 26,RT

## 2022-11-10 PROCEDURE — 93010 ELECTROCARDIOGRAM REPORT: CPT

## 2022-11-10 PROCEDURE — 72170 X-RAY EXAM OF PELVIS: CPT | Mod: 26

## 2022-11-10 PROCEDURE — 76942 ECHO GUIDE FOR BIOPSY: CPT | Mod: 26

## 2022-11-10 RX ORDER — ONDANSETRON 8 MG/1
4 TABLET, FILM COATED ORAL ONCE
Refills: 0 | Status: COMPLETED | OUTPATIENT
Start: 2022-11-10 | End: 2022-11-10

## 2022-11-10 RX ORDER — TETANUS TOXOID, REDUCED DIPHTHERIA TOXOID AND ACELLULAR PERTUSSIS VACCINE, ADSORBED 5; 2.5; 8; 8; 2.5 [IU]/.5ML; [IU]/.5ML; UG/.5ML; UG/.5ML; UG/.5ML
0.5 SUSPENSION INTRAMUSCULAR ONCE
Refills: 0 | Status: COMPLETED | OUTPATIENT
Start: 2022-11-10 | End: 2022-11-10

## 2022-11-10 RX ADMIN — TETANUS TOXOID, REDUCED DIPHTHERIA TOXOID AND ACELLULAR PERTUSSIS VACCINE, ADSORBED 0.5 MILLILITER(S): 5; 2.5; 8; 8; 2.5 SUSPENSION INTRAMUSCULAR at 22:45

## 2022-11-10 NOTE — ED ADULT TRIAGE NOTE - SOURCE OF INFORMATION
OT ACUTE Treatment Session    Pt seen on 10 nursing unit.                                                          Frequency Comments: X [facility]     Admitting complaint:: Somnolence [R40.0]  Urinary tract infection without hematuria, site unspecified [N39.0]                                                                                         Precautions  Seizure Precautions: Yes (03/07/18 0835)  Other Precautions: Falls risk (03/02/18 0941)      ASSESSMENT:  Treatment today focused on dangling, simple command following, yes/no questions.   Current overall ADL status is total assist. Total assist provided for ADLs due to current medical status  Current functional mobility for ADL and Instrumental-ADL tasks is max assist for bed mobility and min-mod assist for sitting balance. Pt tolerates sitting at edge of bed about 10 minutes with min to mod assist due to poor balance utilizing LUE to hold to bed rail.  Pt communicates via thumbs up/down during sitting.  Pt able to answer simple yes/no questions with thumbs up/down.  Pt occasionally completes head nods. Follows simple 1 step commands  Patient  displays  fair progress demonstrated by this date's OT session.    Limitations at this time include weakness, fatigue, cognitive deficits, balance and medical status. Patient will benefit from further skilled OT for continued training with ADL's and functional mobility to help the patient meet goal of maximizing functional independence to promote a safe discharge to next level of care.  Discussed patient goal of increasing ADL independence, discharge planning options and therapy progress made to date with Patient.     Pt seen after being removed from hospice and code status changed to DNR-MMS from comfort cares as pt with increased alertness and command following.  Collaborated with MD gibson: new orders.    RECOMMENDATIONS FOR DISCHARGE:  Recommendations for Discharge: OT: Post acute therapy (03/10/18 8016)    OT Identified  Barriers to Discharge: weakness, w/c dependent with assist from  needed     PT/OT Mobility Equipment for Discharge: To be determined (03/07/18 0835)       ICU Mobility Assesment (PERME):         PLAN: Continue skilled OT, including the following Treatment Interventions: ADL retraining;Functional transfer training;UE strengthening/ROM;Endurance training;Cognitive reorientation;Patient/Family training;Equipment eval/education;Neuro muscular reeducation;Fine motor coordination activities;Compensatory technique education (03/10/18 1601)   Treatment Plan for Next Session: dangle, simple command following, grooming tasks-may need an aid for mobility          EDUCATION:   On this date, the patient was educated on continued OT plan of care.    The response to education was: Needs reinforcement                                                    SUBJECTIVE: Patient's Personal Goal: does not state (03/10/18 1601)   Subjective: pt none verbal, able to answer simple yes/no question with thumbs up/down  (03/10/18 1601)  Subjective/Objective Comments: Chart reviewed.  RNKatty, approved of therapy session.  Pt returned to supine in bed with all items in reach and all needs meet. Bed alarm activated (03/10/18 1601)    OBJECTIVE:Basic Lines: O2;Campbell catheter (03/10/18 1601)  Complex Lines:  (none noted) (03/10/18 1601)  Safety Measures: Alarms (03/10/18 1601)    RN reported Olmos Fall Scale Score: 60       Last 24 hours of Functional Data     ADLs  Self Cares/ADL's  Footwear Assistance: Total Assist - Dependent (03/10/18 1601)  Lower Body Dressing Deficit: Don/doff R sock;Don/doff L sock (03/10/18 1601)    Household mobility  Household Mobility  Supine to Sit: Maximal Assist (Max) (03/10/18 1601)  Sit to Supine: Maximal Assist (Max) (03/10/18 1601)  Sitting - Static: Minimal Assist (Min) (03/10/18 1601)  Sitting - Dynamic: Moderate Assist (Mod) (03/10/18 1601)  Household Mobility Comments #1: Pt tolerates sitting at edge  of bed about 10 minutes with min to mod assist due to poor balance utilizing LUE to hold to bed rail.  Pt communicates via thumbs up/down during sitting  (03/10/18 1601)    Home Management       Tolerance  OT Activity Tolerance  Activity Tolerance: 1:2 Activity to rest (03/10/18 1601)  Activity Tolerance Comments: fair (03/10/18 1601)    Cognition       Patient's Personal Goal: does not state (03/10/18 1601)    Therapy Goals:    Goals  Short Term Goals to Be Reviewed On: 03/12/18 (03/05/18 1501)  Short Term Goals Are The Same as Discharge Goals: No (03/05/18 1501)  Goal Agreement: Patient agrees with goals and treatment plan (03/05/18 1501)  Grooming Short Term Goal 1: pt to wash face with minmal assist (03/05/18 1501)  Grooming Discharge Goal 1: two grooming tasks with set-up (03/05/18 1501)  Grooming Discharge Goal Progress 1: Outcome not met, continue to monitor (03/05/18 1501)  Dressing Short Term Goal 1: don/doff gown with maximal assist (03/05/18 1501)  Dressing Discharge Goal 1: upper body set-up (03/05/18 1501)  Dressing Discharge Goal Progress 1: Outcome not met, continue to monitor (03/05/18 1501)  Dressing Short Term Goal 2: doff hops socks maximal assist (03/05/18 1501)  Dressing Discharge Goal 2: lower body dressing minimal assist (03/05/18 1501)  Dressing Discharge Goal Progress 2: Outcome not met, continue to monitor (03/05/18 1501)  Toileting Short Term Goal 1: maximal assist (03/05/18 1501)  Toileting Discharge Goal 1: minimal assist (03/05/18 1501)  Home Setting Transfer Short Term Goal 1: toilet transfer maximal assist (03/05/18 1501)  Home Setting Transfer Discharge Goal 1: toilet transfer maximal assist (03/05/18 1501)        Total Treatment Time:  OT Time Spent: 40 minutes (03/10/18 1601)    See OT flowsheet for full details regarding the OT therapy provided.        EMS

## 2022-11-10 NOTE — CONSULT NOTE ADULT - SUBJECTIVE AND OBJECTIVE BOX
Orthopaedic Surgery Consult Note    89yo Female brought to ED from nursing home for right hip pain after mechanical fall. Patient states she was hurriedly attempting to assist her facility roommate and forgot to take her walker, resulting in a fall. Patient denies dizziness or lightheadedness. Patient endorses head strike, denies loss of consciousness. Pain localized to the right hip, right knee, and right forearm. Patient ambulates with walker for the past 2 years, and has chronic right knee pain 2/2 arthritis. Patient unsure of her medications/AC    PMH/PSH  ^FALL  Diabetes  CAD (coronary artery disease)  Hypertension  Hyperlipidemia    Medications    Home Medications:  Aspir 81 oral delayed release tablet: 1 tab(s) orally once a day (03 Aug 2018 10:41)  furosemide 20 mg oral tablet: 1 tab(s) orally once a day (03 Aug 2018 10:41)  gemfibrozil 600 mg oral tablet: 1 tab(s) orally 2 times a day (03 Aug 2018 10:41)  glipiZIDE 10 mg oral tablet: 1 tab(s) orally once a day (03 Aug 2018 10:41)  metFORMIN 500 mg oral tablet: 1 tab(s) orally 2 times a day (03 Aug 2018 10:41)  metoprolol tartrate 50 mg oral tablet: 1 tab(s) orally 2 times a day (03 Aug 2018 10:41)  nabumetone 750 mg oral tablet: 2 tab(s) orally once a day (03 Aug 2018 10:41)  pravastatin 40 mg oral tablet: 1 tab(s) orally once a day (03 Aug 2018 10:41)  Protonix 40 mg oral granule, delayed release: 1 each orally once a day (03 Aug 2018 10:41)  Ranexa 500 mg oral tablet, extended release: 1 tab(s) orally 2 times a day (03 Aug 2018 10:41)    Allergies  codeine (Unknown)    T(C): 36.4 (11-10-22 @ 19:35), Max: 36.4 (11-10-22 @ 19:35)  HR: 60 (11-10-22 @ 19:35) (60 - 60)  BP: 187/77 (11-10-22 @ 19:35) (187/77 - 187/77)  RR: 18 (11-10-22 @ 19:35) (18 - 18)  SpO2: 100% (11-10-22 @ 19:35) (100% - 100%)    P/E:  AOx3, NAD  Non-labored breathing    BLUE  Superficial abrasions to right forearm  No swelling/erythema/ecchymosis noted  No deformity/laceration  FAROM  TTP to RUE abrasion, no tenderness to bony palpation  Compartments soft and compressible, no pain w/ passive stretch of digits  SILT Ax/LABCN/R/M/U  AIN/PIN/U intact  Firing deltoid/biceps/triceps/wf/we/epl/fpl/fdp/io   Radial pulse 2+, cap refill <2    Pelvis: stable    RLE  Skin intact  No swelling/erythema/ecchymosis noted  No laceration/abrasion noted  ROM limited by pain  TTP to hip, no TTP to knee or to rest of extremity  Compartments soft and compressible, no pain w/ passive stretch of digits  SILT SP/DP/T/SURAL/  Firing TA/EHL/FHL/GS  DP pulse 2+, cap refill <2    LLE  Skin intact  No swelling/erythema/ecchymosis noted  No laceration/abrasion noted  FAROM  No TTP  Compartments soft and compressible, no pain w/ passive stretch of digits  SILT SP/DP/T/SURAL/  Firing TA/EHL/FHL/GS  DP pulse 2+, cap refill <2    Labs                        12.1   6.75  )-----------( 172      ( 10 Nov 2022 20:22 )             36.9     11-10    135  |  99  |  34<H>  ----------------------------<  246<H>  4.7   |  24  |  1.2    Ca    9.0      10 Nov 2022 20:22    TPro  6.9  /  Alb  4.2  /  TBili  0.2  /  DBili  x   /  AST  19  /  ALT  9   /  AlkPhos  84  11-10    LIVER FUNCTIONS - ( 10 Nov 2022 20:22 )  Alb: 4.2 g/dL / Pro: 6.9 g/dL / ALK PHOS: 84 U/L / ALT: 9 U/L / AST: 19 U/L / GGT: x           PT/INR - ( 10 Nov 2022 20:22 )   PT: 12.10 sec;   INR: 1.06 ratio         PTT - ( 10 Nov 2022 20:22 )  PTT:30.4 sec    Imaging: XR demonstrates displaced right femoral neck fracture    A/P:  89yo Female w/ right femoral neck fracture. Recommend admission and surgical management once patient is medically cleared/optimized, possible OR tomorrow, 11/11/22    Patient present with grandson; states patient's daughter, Karen, is HCP. tel. [phone]    Weight bearing: NWB RLE  Pain control  Diet: NPO  DVT ppx: SCD, 1 dose heparin before midnight if not contraindicated, please hold chemoppx after midnight in advance of surgery  Home medications:   Preop labs: bmp, cbc, coags, type and screen x2, ekg, cxr  IV fluid  Rapid COVID test

## 2022-11-10 NOTE — ED ADULT TRIAGE NOTE - CHIEF COMPLAINT QUOTE
pt miguel ángel from [facility] s/p trip and fall. pt sts she went to help a roommate and she forgot to grab her walker so she tripped and fell on her right side. denies any loc no blood thinners.

## 2022-11-10 NOTE — ED PROVIDER NOTE - NS ED ROS FT
GEN:  no fever, no chills, no generalized weakness  NEURO:  no headache, no dizziness  ENT: no sore throat, no runny nose  CV:  no chest pain, no palpitations  RESP:  no sob, no cough  GI:  no nausea, no vomiting, no abdominal pain, no diarrhea  :  no dysuria, no urinary frequency, no hematuria  MSK:  +right hip pain  SKIN:  no rash, no bruising

## 2022-11-10 NOTE — ED PROVIDER NOTE - PROGRESS NOTE DETAILS
CP: hip fracture noted on xrays. ortho consulted and to evaluate patient Authored by Sagrario Collins DO: Pt signed out to Dr. Hopper at this time pending final ct reads and disposition CP: trauma consult placed via code line. pending trauma evaluation after reviewing results.

## 2022-11-10 NOTE — ED PROVIDER NOTE - ATTENDING CONTRIBUTION TO CARE
89-year-old female past medical history diabetes, CAD, hyperlipidemia, hypertension presents to the emergency department from [facility] status post mechanical trip and fall on her right side.  Patient reports head trauma but no LOC.  Patient reporting constant moderate right-sided hip pain at this time.    +HT no loc, not on any AC. Recalls all events. No fever, chills, n/v, cp,  pleuritic cp, sob, palpitations, diaphoresis, cough, chest wall/rib pain, clavicular pain, ankle pain, knee pain, shoulder pain, wrist pain, no ha/lh/dizziness, numbness/tingling, neck pain/ stiffness, abd pain, urinary symptoms, edema, calf pain/swelling/erythema, sick contacts, recent travel. GCS 15.    On Exam:  Vital Signs: I have reviewed the initial vital signs.  Constitutional: WDWN in nad.  HEAD: No signs of basilar skull fracture.  Integumentary: +superficial skin tears noted to RUE   EYES: No periorbital swelling/ecchymoses. PERRL, EOM intact. No nystagmus. No subconjunctival hemorrhage.   ENT: MMM. No rhinorrhea/otorrhea. No epistaxis,  No septal hematoma. No mastoid ecchymoses. No intraoral bleeding, No loose or craked teeth, no active bleeding. No malocclusion. No TMJ pain.  NECK: Supple, non-tender, No palpable shelves or step-offs.  BACK: No spinous tenderness. No palpable shelves or step-offs.  Cardiovascular: RRR, radial pulses 2/4 b/l. dp and pt pulses 2/4/ b/l. No pain to palpation to chest wall.  Respiratory: BS present b/l, ctabl, no wheezing or crackles, no stridor. No pain to palpation to ribs b/l. No crepitus. No subq emphysema.   Gastrointestinal: Abdomen soft, nd, nt. no r/g.  Musculoskeletal: +shortened and rotated RLE , TTP over R hip, distal pulses intact.   Neurologic: GCS 15. CN II-XII intact, no facial droop or slurring of speech. 5/5 sensation intact throughout upper and lower extremities. (-) Pronator.

## 2022-11-10 NOTE — ED PROVIDER NOTE - CARE PLAN
1 Principal Discharge DX:	Fall   Principal Discharge DX:	Fall  Assessment and plan of treatment:	Plan- labs, pan ct scan, xrs, ekg, reassess.   Principal Discharge DX:	Fall  Assessment and plan of treatment:	Plan- labs, pan ct scan, xrs, ekg, reassess.  Secondary Diagnosis:	Fracture of femoral neck, right, closed  Secondary Diagnosis:	Vertebral compression fracture

## 2022-11-10 NOTE — ED PROVIDER NOTE - CLINICAL SUMMARY MEDICAL DECISION MAKING FREE TEXT BOX
Patient signed out to me from Dr. Collins.  Labs imaging reviewed.  Patient found to have compression fracture and hip fracture.  Discussed with neurosurgery, trauma and orthopedics.  Will admit for further evaluation.

## 2022-11-10 NOTE — ED ADULT TRIAGE NOTE - NURSING HOMES
[facility] and [facility], [facility]

## 2022-11-10 NOTE — ED PROVIDER NOTE - PHYSICAL EXAMINATION
CONSTITUTIONAL: Well-developed; well-nourished; in no acute distress.   SKIN: warm, dry, +2cm skin tear x2 right forearm  HEAD: Normocephalic; atraumatic.  EYES: no conjunctival injection. PERRLA. EOMI.   ENT: No nasal discharge; airway clear.  NECK: Supple; non tender. no midline or paraspinal tenderness.  BACK: no midline or paraspinal tenderness to palpation  CARD: S1, S2 normal; Regular rate and rhythm.   RESP: No wheezes, rales or rhonchi.  ABD: soft ntnd. BS+ in all 4 quadrants.  EXT: +right hip tenderness to palpation unable to range due to pain and RLE appears shortened and externally rotated  NEURO: Alert, oriented, grossly unremarkable.  PSYCH: Cooperative, appropriate.

## 2022-11-10 NOTE — ED PROVIDER NOTE - TOBACCO USE
Spoke with Lesly Xiong from [facility] she will fax over form for verification on tissue specimen that was received  I spoke with Sharmin from [facility] endoscopy suite she provided me with fax number [phone] to send over form  She will have her staff verify tissue specimen and fax back to number on form  No further questions  Never smoker

## 2022-11-10 NOTE — ED PROVIDER NOTE - OBJECTIVE STATEMENT
89 yo female, PMHx of DM, CAD, DLD, HTN, presents s/p mechanical fall onto her right side, complaining of right hip, non-radiating, constant, sharp, no alleviating factors, aggravated by movement, no associated symptoms, sudden onset 6:30 pm at NH when she was walking without her walker and she tripped. She also hit her head. Denies headache, LOC, AC use, nausea, vomiting, dizziness, chest pain, shortness of breath, abdominal pain.

## 2022-11-11 ENCOUNTER — RESULT REVIEW (OUTPATIENT)
Age: 87
End: 2022-11-11

## 2022-11-11 LAB
BACTERIA # UR AUTO: ABNORMAL
EPI CELLS # UR: 4 /HPF — SIGNIFICANT CHANGE UP (ref 0–5)
GLUCOSE BLDC GLUCOMTR-MCNC: 187 MG/DL — HIGH (ref 70–99)
GLUCOSE BLDC GLUCOMTR-MCNC: 215 MG/DL — HIGH (ref 70–99)
GLUCOSE BLDC GLUCOMTR-MCNC: 340 MG/DL — HIGH (ref 70–99)
HYALINE CASTS # UR AUTO: 0 /LPF — SIGNIFICANT CHANGE UP (ref 0–7)
RBC CASTS # UR COMP ASSIST: 3 /HPF — SIGNIFICANT CHANGE UP (ref 0–4)
WBC UR QL: 9 /HPF — HIGH (ref 0–5)

## 2022-11-11 PROCEDURE — 73502 X-RAY EXAM HIP UNI 2-3 VIEWS: CPT | Mod: 26,RT

## 2022-11-11 PROCEDURE — 93010 ELECTROCARDIOGRAM REPORT: CPT

## 2022-11-11 PROCEDURE — 88305 TISSUE EXAM BY PATHOLOGIST: CPT | Mod: 26

## 2022-11-11 PROCEDURE — 88311 DECALCIFY TISSUE: CPT | Mod: 26

## 2022-11-11 PROCEDURE — 99221 1ST HOSP IP/OBS SF/LOW 40: CPT | Mod: GC

## 2022-11-11 PROCEDURE — 99231 SBSQ HOSP IP/OBS SF/LOW 25: CPT

## 2022-11-11 RX ORDER — FUROSEMIDE 40 MG
20 TABLET ORAL DAILY
Refills: 0 | Status: DISCONTINUED | OUTPATIENT
Start: 2022-11-11 | End: 2022-11-15

## 2022-11-11 RX ORDER — CEFAZOLIN SODIUM 1 G
2000 VIAL (EA) INJECTION EVERY 8 HOURS
Refills: 0 | Status: DISCONTINUED | OUTPATIENT
Start: 2022-11-11 | End: 2022-11-11

## 2022-11-11 RX ORDER — INSULIN LISPRO 100/ML
VIAL (ML) SUBCUTANEOUS
Refills: 0 | Status: DISCONTINUED | OUTPATIENT
Start: 2022-11-11 | End: 2022-11-11

## 2022-11-11 RX ORDER — INSULIN LISPRO 100/ML
VIAL (ML) SUBCUTANEOUS
Refills: 0 | Status: DISCONTINUED | OUTPATIENT
Start: 2022-11-11 | End: 2022-11-15

## 2022-11-11 RX ORDER — CEFAZOLIN SODIUM 1 G
2000 VIAL (EA) INJECTION ONCE
Refills: 0 | Status: COMPLETED | OUTPATIENT
Start: 2022-11-12 | End: 2022-11-12

## 2022-11-11 RX ORDER — SENNA PLUS 8.6 MG/1
2 TABLET ORAL AT BEDTIME
Refills: 0 | Status: DISCONTINUED | OUTPATIENT
Start: 2022-11-11 | End: 2022-11-11

## 2022-11-11 RX ORDER — DEXTROSE 50 % IN WATER 50 %
12.5 SYRINGE (ML) INTRAVENOUS ONCE
Refills: 0 | Status: DISCONTINUED | OUTPATIENT
Start: 2022-11-11 | End: 2022-11-11

## 2022-11-11 RX ORDER — RANOLAZINE 500 MG/1
500 TABLET, FILM COATED, EXTENDED RELEASE ORAL
Refills: 0 | Status: DISCONTINUED | OUTPATIENT
Start: 2022-11-11 | End: 2022-11-11

## 2022-11-11 RX ORDER — FUROSEMIDE 40 MG
20 TABLET ORAL DAILY
Refills: 0 | Status: DISCONTINUED | OUTPATIENT
Start: 2022-11-11 | End: 2022-11-11

## 2022-11-11 RX ORDER — PANTOPRAZOLE SODIUM 20 MG/1
40 TABLET, DELAYED RELEASE ORAL DAILY
Refills: 0 | Status: DISCONTINUED | OUTPATIENT
Start: 2022-11-11 | End: 2022-11-15

## 2022-11-11 RX ORDER — CEFTRIAXONE 500 MG/1
1000 INJECTION, POWDER, FOR SOLUTION INTRAMUSCULAR; INTRAVENOUS EVERY 24 HOURS
Refills: 0 | Status: DISCONTINUED | OUTPATIENT
Start: 2022-11-11 | End: 2022-11-11

## 2022-11-11 RX ORDER — GLUCAGON INJECTION, SOLUTION 0.5 MG/.1ML
1 INJECTION, SOLUTION SUBCUTANEOUS ONCE
Refills: 0 | Status: DISCONTINUED | OUTPATIENT
Start: 2022-11-11 | End: 2022-11-15

## 2022-11-11 RX ORDER — DEXTROSE 50 % IN WATER 50 %
12.5 SYRINGE (ML) INTRAVENOUS ONCE
Refills: 0 | Status: DISCONTINUED | OUTPATIENT
Start: 2022-11-11 | End: 2022-11-15

## 2022-11-11 RX ORDER — RANOLAZINE 500 MG/1
500 TABLET, FILM COATED, EXTENDED RELEASE ORAL
Refills: 0 | Status: DISCONTINUED | OUTPATIENT
Start: 2022-11-11 | End: 2022-11-15

## 2022-11-11 RX ORDER — ACETAMINOPHEN 500 MG
650 TABLET ORAL EVERY 6 HOURS
Refills: 0 | Status: DISCONTINUED | OUTPATIENT
Start: 2022-11-11 | End: 2022-11-15

## 2022-11-11 RX ORDER — METOPROLOL TARTRATE 50 MG
50 TABLET ORAL
Refills: 0 | Status: DISCONTINUED | OUTPATIENT
Start: 2022-11-11 | End: 2022-11-11

## 2022-11-11 RX ORDER — ASPIRIN/CALCIUM CARB/MAGNESIUM 324 MG
81 TABLET ORAL DAILY
Refills: 0 | Status: DISCONTINUED | OUTPATIENT
Start: 2022-11-11 | End: 2022-11-15

## 2022-11-11 RX ORDER — LIDOCAINE 4 G/100G
1 CREAM TOPICAL EVERY 24 HOURS
Refills: 0 | Status: DISCONTINUED | OUTPATIENT
Start: 2022-11-11 | End: 2022-11-15

## 2022-11-11 RX ORDER — METOPROLOL TARTRATE 50 MG
50 TABLET ORAL
Refills: 0 | Status: DISCONTINUED | OUTPATIENT
Start: 2022-11-11 | End: 2022-11-15

## 2022-11-11 RX ORDER — INSULIN LISPRO 100/ML
10 VIAL (ML) SUBCUTANEOUS ONCE
Refills: 0 | Status: COMPLETED | OUTPATIENT
Start: 2022-11-11 | End: 2022-11-11

## 2022-11-11 RX ORDER — GLUCAGON INJECTION, SOLUTION 0.5 MG/.1ML
1 INJECTION, SOLUTION SUBCUTANEOUS ONCE
Refills: 0 | Status: DISCONTINUED | OUTPATIENT
Start: 2022-11-11 | End: 2022-11-11

## 2022-11-11 RX ORDER — DEXTROSE 50 % IN WATER 50 %
25 SYRINGE (ML) INTRAVENOUS ONCE
Refills: 0 | Status: DISCONTINUED | OUTPATIENT
Start: 2022-11-11 | End: 2022-11-15

## 2022-11-11 RX ORDER — HEPARIN SODIUM 5000 [USP'U]/ML
5000 INJECTION INTRAVENOUS; SUBCUTANEOUS EVERY 8 HOURS
Refills: 0 | Status: DISCONTINUED | OUTPATIENT
Start: 2022-11-11 | End: 2022-11-11

## 2022-11-11 RX ORDER — SODIUM CHLORIDE 9 MG/ML
1000 INJECTION, SOLUTION INTRAVENOUS
Refills: 0 | Status: DISCONTINUED | OUTPATIENT
Start: 2022-11-11 | End: 2022-11-11

## 2022-11-11 RX ORDER — SENNA PLUS 8.6 MG/1
2 TABLET ORAL AT BEDTIME
Refills: 0 | Status: DISCONTINUED | OUTPATIENT
Start: 2022-11-11 | End: 2022-11-15

## 2022-11-11 RX ORDER — DEXTROSE 50 % IN WATER 50 %
15 SYRINGE (ML) INTRAVENOUS ONCE
Refills: 0 | Status: DISCONTINUED | OUTPATIENT
Start: 2022-11-11 | End: 2022-11-15

## 2022-11-11 RX ORDER — DEXTROSE 50 % IN WATER 50 %
25 SYRINGE (ML) INTRAVENOUS ONCE
Refills: 0 | Status: DISCONTINUED | OUTPATIENT
Start: 2022-11-11 | End: 2022-11-11

## 2022-11-11 RX ORDER — ASPIRIN/CALCIUM CARB/MAGNESIUM 324 MG
81 TABLET ORAL DAILY
Refills: 0 | Status: DISCONTINUED | OUTPATIENT
Start: 2022-11-11 | End: 2022-11-11

## 2022-11-11 RX ORDER — ACETAMINOPHEN 500 MG
650 TABLET ORAL EVERY 6 HOURS
Refills: 0 | Status: DISCONTINUED | OUTPATIENT
Start: 2022-11-11 | End: 2022-11-11

## 2022-11-11 RX ORDER — PANTOPRAZOLE SODIUM 20 MG/1
40 TABLET, DELAYED RELEASE ORAL DAILY
Refills: 0 | Status: DISCONTINUED | OUTPATIENT
Start: 2022-11-11 | End: 2022-11-11

## 2022-11-11 RX ORDER — DEXTROSE 50 % IN WATER 50 %
15 SYRINGE (ML) INTRAVENOUS ONCE
Refills: 0 | Status: DISCONTINUED | OUTPATIENT
Start: 2022-11-11 | End: 2022-11-11

## 2022-11-11 RX ORDER — HEPARIN SODIUM 5000 [USP'U]/ML
5000 INJECTION INTRAVENOUS; SUBCUTANEOUS EVERY 8 HOURS
Refills: 0 | Status: DISCONTINUED | OUTPATIENT
Start: 2022-11-12 | End: 2022-11-15

## 2022-11-11 RX ORDER — LIDOCAINE 4 G/100G
1 CREAM TOPICAL EVERY 24 HOURS
Refills: 0 | Status: DISCONTINUED | OUTPATIENT
Start: 2022-11-11 | End: 2022-11-11

## 2022-11-11 RX ORDER — ONDANSETRON 8 MG/1
4 TABLET, FILM COATED ORAL ONCE
Refills: 0 | Status: DISCONTINUED | OUTPATIENT
Start: 2022-11-11 | End: 2022-11-11

## 2022-11-11 RX ORDER — CEFAZOLIN SODIUM 1 G
2000 VIAL (EA) INJECTION ONCE
Refills: 0 | Status: COMPLETED | OUTPATIENT
Start: 2022-11-11 | End: 2022-11-11

## 2022-11-11 RX ADMIN — PANTOPRAZOLE SODIUM 40 MILLIGRAM(S): 20 TABLET, DELAYED RELEASE ORAL at 12:12

## 2022-11-11 RX ADMIN — RANOLAZINE 500 MILLIGRAM(S): 500 TABLET, FILM COATED, EXTENDED RELEASE ORAL at 06:23

## 2022-11-11 RX ADMIN — HEPARIN SODIUM 5000 UNIT(S): 5000 INJECTION INTRAVENOUS; SUBCUTANEOUS at 06:23

## 2022-11-11 RX ADMIN — Medication 650 MILLIGRAM(S): at 12:12

## 2022-11-11 RX ADMIN — Medication 50 MILLIGRAM(S): at 06:23

## 2022-11-11 RX ADMIN — Medication 6: at 08:47

## 2022-11-11 RX ADMIN — Medication 20 MILLIGRAM(S): at 06:23

## 2022-11-11 RX ADMIN — Medication 20 MILLIGRAM(S): at 19:29

## 2022-11-11 RX ADMIN — SENNA PLUS 2 TABLET(S): 8.6 TABLET ORAL at 21:24

## 2022-11-11 RX ADMIN — Medication 50 MILLIGRAM(S): at 19:29

## 2022-11-11 RX ADMIN — SODIUM CHLORIDE 75 MILLILITER(S): 9 INJECTION, SOLUTION INTRAVENOUS at 02:21

## 2022-11-11 RX ADMIN — Medication 10 UNIT(S): at 21:38

## 2022-11-11 RX ADMIN — Medication 81 MILLIGRAM(S): at 12:12

## 2022-11-11 RX ADMIN — RANOLAZINE 500 MILLIGRAM(S): 500 TABLET, FILM COATED, EXTENDED RELEASE ORAL at 19:29

## 2022-11-11 RX ADMIN — Medication 100 MILLIGRAM(S): at 22:54

## 2022-11-11 RX ADMIN — CEFTRIAXONE 100 MILLIGRAM(S): 500 INJECTION, POWDER, FOR SOLUTION INTRAMUSCULAR; INTRAVENOUS at 19:29

## 2022-11-11 RX ADMIN — Medication 650 MILLIGRAM(S): at 06:23

## 2022-11-11 NOTE — PRE-OP CHECKLIST - SURGICAL CONSENT
variance faxed/done variance faxed. Pt states she has difficulty seeing. alert and oriented x 4. Pt gives verbal consent for daugther to sign/done

## 2022-11-11 NOTE — PRE-OP CHECKLIST - NS PREOP CHK MONITOR ANESTHESIA CONSENT
Pt states she has difficulty seeing. alert and oriented x 4. Pt gives verbal consent for daugther to sign/done

## 2022-11-11 NOTE — CONSULT NOTE ADULT - SUBJECTIVE AND OBJECTIVE BOX
Neurosurgery Consult  Patient is a 89y old  Female who presents with a chief complaint of fall  HPI: 89 yo female, PMHx of DM, CAD, DLD, HTN, presents s/p mechanical fall onto her right side, complaining of right hip, non-radiating, constant, sharp, no alleviating factors, aggravated by movement, no associated symptoms, sudden onset 6:30 pm at NH when she was walking without her walker and she tripped. She also hit her head. Denies headache, LOC, AC use, nausea, vomiting, dizziness, chest pain, shortness of breath, abdominal pain. Trauma assessment in ED: ABCs intact , GCS 15 , AAOx3.   INTERVAL HX; Pt had a fall from a standing position onto right side breaking hip. Ortho schedule to repair hip today. CT sshows acute compression fracture of T12.    MECHANISM OF INJURY:  [] Blunt    [] MVC	 [x] Fall	 [] Pedestrian Struck	 [] Motorcycle    [] Assault    [] Bicycle collision   [] Sports injury   [] Penetrating   [] Gun Shot Wound     [] Stab Wound  GCS: 15 	E: 4	V: 5	M: 6   PAST MEDICAL & SURGICAL HISTORY: Diabetes CAD (coronary artery disease) Hypertension Hyperlipidemia    Allergies  codeine (Unknown)  Intolerances   Home Medications: Aspir 81 oral delayed release tablet: 1 tab(s) orally once a day (03 Aug 2018 10:41) furosemide 20 mg oral tablet: 1 tab(s) orally once a day (03 Aug 2018 10:41) gemfibrozil 600 mg oral tablet: 1 tab(s) orally 2 times a day (03 Aug 2018 10:41) glipiZIDE 10 mg oral tablet: 1 tab(s) orally once a day (03 Aug 2018 10:41) metFORMIN 500 mg oral tablet: 1 tab(s) orally 2 times a day (03 Aug 2018 10:41) metoprolol tartrate 50 mg oral tablet: 1 tab(s) orally 2 times a day (03 Aug 2018 10:41) nabumetone 750 mg oral tablet: 2 tab(s) orally once a day (03 Aug 2018 10:41) pravastatin 40 mg oral tablet: 1 tab(s) orally once a day (03 Aug 2018 10:41) Protonix 40 mg oral granule, delayed release: 1 each orally once a day (03 Aug 2018 10:41) Ranexa 500 mg oral tablet, extended release: 1 tab(s) orally 2 times a day (03 Aug 2018 10:41)   ROS: 10-system review is otherwise negative except HPI above.    Primary Survey:   A - airway intact B - bilateral breath sounds and good chest rise C - palpable pulses in all extremities D - GCS 15 on arrival, DONOVAN Exposure obtained  Vital Signs Last 24 Hrs T(C): 36.1 (2022 02:31), Max: 36.4 (10 Nov 2022 19:35) T(F): 96.9 (2022 02:31), Max: 97.6 (10 Nov 2022 19:35) HR: 74 (2022 02:) (60 - 74) BP: 166/66 (2022 02:) (166/66 - 187/77) BP(mean): 95 (2022 02:) (95 - 95) RR: 18 (2022 02:) (18 - 18) SpO2: 99% (:) (99% - 100%)  Parameters below as of 2022 02:31 Patient On (Oxygen Delivery Method): room air     (2022 04:40)   PAST MEDICAL & SURGICAL HISTORY: Diabetes CAD (coronary artery disease) Hypertension Hyperlipidemia     FAMILY HISTORY:   Social History: (-) x 3 Allergies  codeine (Unknown)      MEDICATIONS  (STANDING): acetaminophen     Tablet .. 650 milliGRAM(s) Oral every 6 hours aspirin enteric coated 81 milliGRAM(s) Oral daily cefTRIAXone   IVPB 1000 milliGRAM(s) IV Intermittent every 24 hours dextrose 5%. 1000 milliLiter(s) (100 mL/Hr) IV Continuous <Continuous> dextrose 5%. 1000 milliLiter(s) (50 mL/Hr) IV Continuous <Continuous> dextrose 50% Injectable 25 Gram(s) IV Push once dextrose 50% Injectable 12.5 Gram(s) IV Push once dextrose 50% Injectable 25 Gram(s) IV Push once furosemide    Tablet 20 milliGRAM(s) Oral daily glucagon  Injectable 1 milliGRAM(s) IntraMuscular once heparin   Injectable 5000 Unit(s) SubCutaneous every 8 hours insulin lispro (ADMELOG) corrective regimen sliding scale   SubCutaneous three times a day before meals lactated ringers. 1000 milliLiter(s) (75 mL/Hr) IV Continuous <Continuous> lidocaine   4% Patch 1 Patch Transdermal every 24 hours metoprolol tartrate 50 milliGRAM(s) Oral two times a day pantoprazole   Suspension 40 milliGRAM(s) Oral daily ranolazine 500 milliGRAM(s) Oral two times a day senna 2 Tablet(s) Oral at bedtime  MEDICATIONS  (PRN): dextrose Oral Gel 15 Gram(s) Oral once PRN Blood Glucose LESS THAN 70 milliGRAM(s)/deciliter   Review of systems:   Constitutional: No fever, weight loss or fatigue   Eyes: No eye pain or discharge ENMT:  No difficulty hearing; No sinus or throat pain Neck: No pain or stiffness Respiratory: No cough, wheezing, chills or hemoptysis Cardiovascular: No chest pain, palpitations, shortness of breath, dyspnea on exertion Gastrointestinal: No abdominal pain, nausea, vomiting or hematemesis; No diarrhea or constipation.  Genitourinary: No dysuria, frequency, hematuria or incontinence Neurological: As per HPI Skin: No rashes or lesions  Endocrine: No heat or cold intolerance; No hair loss Musculoskeletal: No joint pain or swelling Psychiatric: No depression, anxiety, mood swings Heme/Lymph: No easy bruising or bleeding gums  Vital Signs Last 24 Hrs T(C): 36.8 (2022 08:58), Max: 36.8 (2022 08:58) T(F): 98.2 (2022 08:58), Max: 98.2 (2022 08:58) HR: 63 (2022 08:58) (60 - 74) BP: 189/80 (2022 08:58) (166/66 - 189/80) BP(mean): 115 (2022 08:58) (95 - 115) RR: 18 (2022 08:58) (18 - 18) SpO2: 99% (2022 08:58) (99% - 100%)  Parameters below as of 2022 08:58 Patient On (Oxygen Delivery Method): room air    Neurologic Examination: General:  Appearance is consistent with chronologic age.  No abnormal facies.  General: The patient is oriented to person, place, time and date.  Recent and remote memory intact.  Fund of knowledge is intact and normal.  Language with normal repetition, comprehension and naming.  Nondysarthric.   Cranial nerves: intact VA, VFF.  EOMI w/o nystagmus, skew or reported double vision.  PERRL.  No ptosis/weakness of eyelid closure.  Facial sensation is normal with normal bite.  Motor examination:   Normal tone, bulk and range of motion with exception of RLE Formal Muscle Strength Testing: (MRC grade R/L) 5/5 UE; No observable drift.  LLE 5.5  RLE -broken hip Sensory examination:   Intact to light touch in all extremities.   .    Labs:  CBC Full  -  ( 10 Nov 2022 20:22 ) WBC Count : 6.75 K/uL RBC Count : 3.68 M/uL Hemoglobin : 12.1 g/dL Hematocrit : 36.9 % Platelet Count - Automated : 172 K/uL Mean Cell Volume : 100.3 fL Mean Cell Hemoglobin : 32.9 pg Mean Cell Hemoglobin Concentration : 32.8 g/dL Auto Neutrophil # : 4.96 K/uL Auto Lymphocyte # : 0.93 K/uL Auto Monocyte # : 0.63 K/uL Auto Eosinophil # : 0.08 K/uL Auto Basophil # : 0.04 K/uL Auto Neutrophil % : 73.5 % Auto Lymphocyte % : 13.8 % Auto Monocyte % : 9.3 % Auto Eosinophil % : 1.2 % Auto Basophil % : 0.6 %  1110  135  |  99  |  34<H> ----------------------------<  246<H> 4.7   |  24  |  1.2  Ca    9.0      10 Nov 2022 20:22  TPro  6.9  /  Alb  4.2  /  TBili  0.2  /  DBili  x   /  AST  19  /  ALT  9   /  AlkPhos  84  11-10  LIVER FUNCTIONS - ( 10 Nov 2022 20:22 ) Alb: 4.2 g/dL / Pro: 6.9 g/dL / ALK PHOS: 84 U/L / ALT: 9 U/L / AST: 19 U/L / GGT: x         PT/INR - ( 10 Nov 2022 20:22 )   PT: 12.10 sec;   INR: 1.06 ratio      PTT - ( 10 Nov 2022 20:22 )  PTT:30.4 sec Urinalysis Basic - ( 10 Nov 2022 22:56 )  Color: Light Yellow / Appearance: Clear / S.028 / pH: x Gluc: x / Ketone: Negative  / Bili: Negative / Urobili: <2 mg/dL  Blood: x / Protein: 30 mg/dL / Nitrite: Negative  Leuk Esterase: Large / RBC: 3 /HPF / WBC 9 /HPF  Sq Epi: x / Non Sq Epi: 4 /HPF / Bacteria: Many     Neuroimaging: NCHCT: CT Head No Cont:  ACC: [id number] EXAM:  CT CERVICAL SPINE                       ACC: [id number] EXAM:  CT BRAIN                        PROCEDURE DATE:  11/10/2022      INTERPRETATION:  Indication: Head injury. Neck injury. Trauma to neck.  Trauma to head.  Technique: CT head without IV contrast, and CT cervical spine without IV  contrast performed. Multiple axial CT images of the head were obtained  from the base of the skull to the vertex without the administration of IV  contrast. Subsequently, multiple axial CT images of the cervical spine  were obtained with coronal and sagittal reformats.  Comparison: CT head 8/3/2018.  Images of cervical spine degraded by motion.  CT head findings: Ventricular system, basal cisterns andsulcal patterns are symmetric and  appropriate for patient's age. No acute extra-axial collection.  No mass effect, midline shift or acute  hemorrhage is seen. Patchy hypodensities in the periventricular and subcortical white matter  withoutmass effect compatible with chronic microvascular changes. No depressed skull fracture. Paranasal sinuses and mastoid air cells are well-aerated.  CT cervical spine findings: No acute fracture.  Vertebral body heights grossly preserved.  No  spondylolisthesis. Dens is intact.  No prevertebral soft tissue swelling. Multilevel degenerative change.   BONES AND SOFT TISSUES: Bones appear diffusely osteopenic. There is an acute right femoral neck subcapital fracture with slight angulation and impaction. Acute appearing compression fracture of T12. No significant retropulsion is identified. Grade 1 anterolisthesis of L4 on L5. Respiratory motion limits evaluation for subtle sternal or rib fractures. Multiple chronic appearing bilateral anterior rib fractures are noted, sequelae of prior resuscitation.   IMPRESSION: Acute subcapital impacted and angulated right femoral neck fracture. Acute compression fracture of T12. No significant retropulsion identified. No evidence for acute traumatic solid organ injury. 3.7 cm left ovarian cyst. Continued ultrasound follow-up is recommended in this age group.  --- End of Report ---      ALIZE LARA MD; Attending Radiologist This document has been electronically signed. Nov 10 2022 11:00PM Notes  	   RAMU AHN MD; Attending Radiologist This document has been electronically signed. Nov 10 2022 10:01PM (11-10-22 @ 21:56)    Assessment:  This is a 89y Female with h/o htn, CAD, Hyperlidemia, DM pesents after a fall with compression fracture of t12,  in the setting of a right hip fracture   Plan: No acute intervention         Recommend TLSO or Abdominal brace when sitting upin bed         Obtain a MRI of T/L spine survey when pt is stable post op for further evaluation   Case discussed and films reviewed with         Neurosurgery Consult  Patient is a 89y old  Female who presents with a chief complaint of fall  HPI: 89 yo female, PMHx of DM, CAD, DLD, HTN, presents s/p mechanical fall onto her right side, complaining of right hip, non-radiating, constant, sharp, no alleviating factors, aggravated by movement, no associated symptoms, sudden onset 6:30 pm at NH when she was walking without her walker and she tripped. She also hit her head. Denies headache, LOC, AC use, nausea, vomiting, dizziness, chest pain, shortness of breath, abdominal pain. Trauma assessment in ED: ABCs intact , GCS 15 , AAOx3.   INTERVAL HX; Pt had a fall from a standing position onto right side breaking hip. Ortho schedule to repair hip today. CT sshows acute compression fracture of T12.    MECHANISM OF INJURY:  [] Blunt    [] MVC	 [x] Fall	 [] Pedestrian Struck	 [] Motorcycle    [] Assault    [] Bicycle collision   [] Sports injury   [] Penetrating   [] Gun Shot Wound     [] Stab Wound  GCS: 15 	E: 4	V: 5	M: 6   PAST MEDICAL & SURGICAL HISTORY: Diabetes CAD (coronary artery disease) Hypertension Hyperlipidemia    Allergies  codeine (Unknown)  Intolerances   Home Medications: Aspir 81 oral delayed release tablet: 1 tab(s) orally once a day (03 Aug 2018 10:41) furosemide 20 mg oral tablet: 1 tab(s) orally once a day (03 Aug 2018 10:41) gemfibrozil 600 mg oral tablet: 1 tab(s) orally 2 times a day (03 Aug 2018 10:41) glipiZIDE 10 mg oral tablet: 1 tab(s) orally once a day (03 Aug 2018 10:41) metFORMIN 500 mg oral tablet: 1 tab(s) orally 2 times a day (03 Aug 2018 10:41) metoprolol tartrate 50 mg oral tablet: 1 tab(s) orally 2 times a day (03 Aug 2018 10:41) nabumetone 750 mg oral tablet: 2 tab(s) orally once a day (03 Aug 2018 10:41) pravastatin 40 mg oral tablet: 1 tab(s) orally once a day (03 Aug 2018 10:41) Protonix 40 mg oral granule, delayed release: 1 each orally once a day (03 Aug 2018 10:41) Ranexa 500 mg oral tablet, extended release: 1 tab(s) orally 2 times a day (03 Aug 2018 10:41)   ROS: 10-system review is otherwise negative except HPI above.    Primary Survey:   A - airway intact B - bilateral breath sounds and good chest rise C - palpable pulses in all extremities D - GCS 15 on arrival, DONOVAN Exposure obtained  Vital Signs Last 24 Hrs T(C): 36.1 (2022 02:31), Max: 36.4 (10 Nov 2022 19:35) T(F): 96.9 (2022 02:31), Max: 97.6 (10 Nov 2022 19:35) HR: 74 (2022 02:) (60 - 74) BP: 166/66 (2022 02:) (166/66 - 187/77) BP(mean): 95 (2022 02:) (95 - 95) RR: 18 (2022 02:) (18 - 18) SpO2: 99% (:) (99% - 100%)  Parameters below as of 2022 02:31 Patient On (Oxygen Delivery Method): room air     (2022 04:40)   PAST MEDICAL & SURGICAL HISTORY: Diabetes CAD (coronary artery disease) Hypertension Hyperlipidemia     FAMILY HISTORY:   Social History: (-) x 3 Allergies  codeine (Unknown)      MEDICATIONS  (STANDING): acetaminophen     Tablet .. 650 milliGRAM(s) Oral every 6 hours aspirin enteric coated 81 milliGRAM(s) Oral daily cefTRIAXone   IVPB 1000 milliGRAM(s) IV Intermittent every 24 hours dextrose 5%. 1000 milliLiter(s) (100 mL/Hr) IV Continuous <Continuous> dextrose 5%. 1000 milliLiter(s) (50 mL/Hr) IV Continuous <Continuous> dextrose 50% Injectable 25 Gram(s) IV Push once dextrose 50% Injectable 12.5 Gram(s) IV Push once dextrose 50% Injectable 25 Gram(s) IV Push once furosemide    Tablet 20 milliGRAM(s) Oral daily glucagon  Injectable 1 milliGRAM(s) IntraMuscular once heparin   Injectable 5000 Unit(s) SubCutaneous every 8 hours insulin lispro (ADMELOG) corrective regimen sliding scale   SubCutaneous three times a day before meals lactated ringers. 1000 milliLiter(s) (75 mL/Hr) IV Continuous <Continuous> lidocaine   4% Patch 1 Patch Transdermal every 24 hours metoprolol tartrate 50 milliGRAM(s) Oral two times a day pantoprazole   Suspension 40 milliGRAM(s) Oral daily ranolazine 500 milliGRAM(s) Oral two times a day senna 2 Tablet(s) Oral at bedtime  MEDICATIONS  (PRN): dextrose Oral Gel 15 Gram(s) Oral once PRN Blood Glucose LESS THAN 70 milliGRAM(s)/deciliter   Review of systems:   Constitutional: No fever, weight loss or fatigue   Eyes: No eye pain or discharge ENMT:  No difficulty hearing; No sinus or throat pain Neck: No pain or stiffness Respiratory: No cough, wheezing, chills or hemoptysis Cardiovascular: No chest pain, palpitations, shortness of breath, dyspnea on exertion Gastrointestinal: No abdominal pain, nausea, vomiting or hematemesis; No diarrhea or constipation.  Genitourinary: No dysuria, frequency, hematuria or incontinence Neurological: As per HPI Skin: No rashes or lesions  Endocrine: No heat or cold intolerance; No hair loss Musculoskeletal: No joint pain or swelling Psychiatric: No depression, anxiety, mood swings Heme/Lymph: No easy bruising or bleeding gums  Vital Signs Last 24 Hrs T(C): 36.8 (2022 08:58), Max: 36.8 (2022 08:58) T(F): 98.2 (2022 08:58), Max: 98.2 (2022 08:58) HR: 63 (2022 08:58) (60 - 74) BP: 189/80 (2022 08:58) (166/66 - 189/80) BP(mean): 115 (2022 08:58) (95 - 115) RR: 18 (2022 08:58) (18 - 18) SpO2: 99% (2022 08:58) (99% - 100%)  Parameters below as of 2022 08:58 Patient On (Oxygen Delivery Method): room air    Neurologic Examination: General:  Appearance is consistent with chronologic age.  No abnormal facies.  General: The patient is oriented to person, place, time and date.  Recent and remote memory intact.  Fund of knowledge is intact and normal.  Language with normal repetition, comprehension and naming.  Nondysarthric.   Cranial nerves: intact VA, VFF.  EOMI w/o nystagmus, skew or reported double vision.  PERRL.  No ptosis/weakness of eyelid closure.  Facial sensation is normal with normal bite.  Motor examination:   Normal tone, bulk and range of motion with exception of RLE Formal Muscle Strength Testing: (MRC grade R/L) 5/5 UE; No observable drift.  LLE 5.5  RLE -broken hip Sensory examination:   Intact to light touch in all extremities.   .    Labs:  CBC Full  -  ( 10 Nov 2022 20:22 ) WBC Count : 6.75 K/uL RBC Count : 3.68 M/uL Hemoglobin : 12.1 g/dL Hematocrit : 36.9 % Platelet Count - Automated : 172 K/uL Mean Cell Volume : 100.3 fL Mean Cell Hemoglobin : 32.9 pg Mean Cell Hemoglobin Concentration : 32.8 g/dL Auto Neutrophil # : 4.96 K/uL Auto Lymphocyte # : 0.93 K/uL Auto Monocyte # : 0.63 K/uL Auto Eosinophil # : 0.08 K/uL Auto Basophil # : 0.04 K/uL Auto Neutrophil % : 73.5 % Auto Lymphocyte % : 13.8 % Auto Monocyte % : 9.3 % Auto Eosinophil % : 1.2 % Auto Basophil % : 0.6 %  1110  135  |  99  |  34<H> ----------------------------<  246<H> 4.7   |  24  |  1.2  Ca    9.0      10 Nov 2022 20:22  TPro  6.9  /  Alb  4.2  /  TBili  0.2  /  DBili  x   /  AST  19  /  ALT  9   /  AlkPhos  84  11-10  LIVER FUNCTIONS - ( 10 Nov 2022 20:22 ) Alb: 4.2 g/dL / Pro: 6.9 g/dL / ALK PHOS: 84 U/L / ALT: 9 U/L / AST: 19 U/L / GGT: x         PT/INR - ( 10 Nov 2022 20:22 )   PT: 12.10 sec;   INR: 1.06 ratio      PTT - ( 10 Nov 2022 20:22 )  PTT:30.4 sec Urinalysis Basic - ( 10 Nov 2022 22:56 )  Color: Light Yellow / Appearance: Clear / S.028 / pH: x Gluc: x / Ketone: Negative  / Bili: Negative / Urobili: <2 mg/dL  Blood: x / Protein: 30 mg/dL / Nitrite: Negative  Leuk Esterase: Large / RBC: 3 /HPF / WBC 9 /HPF  Sq Epi: x / Non Sq Epi: 4 /HPF / Bacteria: Many     Neuroimaging: NCHCT: CT Head No Cont:  ACC: [id number] EXAM:  CT CERVICAL SPINE                       ACC: [id number] EXAM:  CT BRAIN                        PROCEDURE DATE:  11/10/2022      INTERPRETATION:  Indication: Head injury. Neck injury. Trauma to neck.  Trauma to head.  Technique: CT head without IV contrast, and CT cervical spine without IV  contrast performed. Multiple axial CT images of the head were obtained  from the base of the skull to the vertex without the administration of IV  contrast. Subsequently, multiple axial CT images of the cervical spine  were obtained with coronal and sagittal reformats.  Comparison: CT head 8/3/2018.  Images of cervical spine degraded by motion.  CT head findings: Ventricular system, basal cisterns andsulcal patterns are symmetric and  appropriate for patient's age. No acute extra-axial collection.  No mass effect, midline shift or acute  hemorrhage is seen. Patchy hypodensities in the periventricular and subcortical white matter  withoutmass effect compatible with chronic microvascular changes. No depressed skull fracture. Paranasal sinuses and mastoid air cells are well-aerated.  CT cervical spine findings: No acute fracture.  Vertebral body heights grossly preserved.  No  spondylolisthesis. Dens is intact.  No prevertebral soft tissue swelling. Multilevel degenerative change.   BONES AND SOFT TISSUES: Bones appear diffusely osteopenic. There is an acute right femoral neck subcapital fracture with slight angulation and impaction. Acute appearing compression fracture of T12. No significant retropulsion is identified. Grade 1 anterolisthesis of L4 on L5. Respiratory motion limits evaluation for subtle sternal or rib fractures. Multiple chronic appearing bilateral anterior rib fractures are noted, sequelae of prior resuscitation.   IMPRESSION: Acute subcapital impacted and angulated right femoral neck fracture. Acute compression fracture of T12. No significant retropulsion identified. No evidence for acute traumatic solid organ injury. 3.7 cm left ovarian cyst. Continued ultrasound follow-up is recommended in this age group.  --- End of Report ---      ALIZE LARA MD; Attending Radiologist This document has been electronically signed. Nov 10 2022 11:00PM Notes  	   RAMU AHN MD; Attending Radiologist This document has been electronically signed. Nov 10 2022 10:01PM (11-10-22 @ 21:56)    Assessment:  This is a 89y Female with h/o htn, CAD, Hyperlidemia, DM pesents after a fall with compression fracture of t12,  in the setting of a right hip fracture   Plan: No acute intervention         Recommend TLSO or Abdominal brace when sitting up in bed         No contraindications for repair of hip fracture         Obtain a MRI of T/L spine survey when pt is stable post op for further evaluation   Case discussed and films reviewed with

## 2022-11-11 NOTE — H&P ADULT - ASSESSMENT
ASSESSMENT:  89F w/ PMH/PSH as above who presents s/p mechanical fall    Injuries identified:   - R Femoral neck fx  - T12 compression fx    PLAN:   - Admission to trauma, Floors  - NSGY consult for compression fx  - Ortho consult - plan for OR 11/11 pending cardiac clearance  - ISS and glucose control  - Preop for OR    Additional consultations:  - Neurosurgery  - Orthopedics  - PT/Rehab/SW  - Hospitalist/Medicine     Disposition pending results of above labs and imaging  Above plan discussed with Trauma attending, Dr. Jolley  , patient, patient family, and ED team  --------------------------------------------------------------------------------------  11-11-22 @ 04:40    TRAUMA SENIOR SPECTRA: [id number]  TRAUMA TEAM SPECTRA: [id number]

## 2022-11-11 NOTE — PHYSICAL THERAPY INITIAL EVALUATION ADULT - SPECIFY REASON(S)
Hold PT patient pending sx 11/11 for unstable hip fx as well as open NSG consult for t12 compression fx . Family  educated in rational for hold and PT plan , as well as supine therepeutic exercise

## 2022-11-11 NOTE — PATIENT PROFILE ADULT - FALL HARM RISK - HARM RISK INTERVENTIONS
Assistance with ambulation/Assistance OOB with selected safe patient handling equipment/Communicate Risk of Fall with Harm to all staff/Discuss with provider need for PT consult/Monitor gait and stability/Provide patient with walking aids - walker, cane, crutches/Reinforce activity limits and safety measures with patient and family/Sit up slowly, dangle for a short time, stand at bedside before walking/Tailored Fall Risk Interventions/Use of alarms - bed, chair and/or voice tab/Visual Cue: Yellow wristband and red socks/Bed in lowest position, wheels locked, appropriate side rails in place/Call bell, personal items and telephone in reach/Instruct patient to call for assistance before getting out of bed or chair/Non-slip footwear when patient is out of bed/[place] to call system/Physically safe environment - no spills, clutter or unnecessary equipment/Purposeful Proactive Rounding/Room/bathroom lighting operational, light cord in reach

## 2022-11-11 NOTE — PROGRESS NOTE ADULT - ASSESSMENT
89F w/ HTN, HLD, CAD, DM2 who presents s/p mechanical fall cb Right hip fracture and T12 compression fracture.    #Mechanical fall cb Right hip fracture and T12 compression fracture  Plan for OR today. Cardio risk stratification noted  - Post-op care per surgery team    #HTN/HLD  #CAD  - Cont lasix 20mg qD  - Cont metoprolol tartrate 50 BID  - Cont ASA 81mg qD  - Cont ranolazine 500 BID    #DM2  - Cont ISS    DVT PPX, heparin SC 89F w/ HTN, HLD, CAD, DM2 who presents s/p mechanical fall cb Right hip fracture and T12 compression fracture.    #Mechanical fall cb Right hip fracture and T12 compression fracture  Plan for OR today. Cardio risk stratification noted  - Per ACC Guidelines, pt would be elevated risk for moderate risk procedure. Further cardiac workup is not indicated.   - Post-op care per surgery team    #HTN/HLD  #CAD  - Cont lasix 20mg qD  - Cont metoprolol tartrate 50 BID  - Cont ASA 81mg qD  - Cont ranolazine 500 BID    #DM2  - Cont ISS    DVT PPX, heparin SC

## 2022-11-11 NOTE — H&P ADULT - ATTENDING COMMENTS
Seen and examined at 23:50 on 11/10.   Agree with above note. ABCs intact with GCS 15.   89 yo female s/p fall with - R Femoral neck fx,  T12 compression fx.  Plan for admission to floor, NSG and Ortho consults. Rest of plan as above.     Pamela Jolley MD

## 2022-11-11 NOTE — H&P ADULT - BIRTH SEX
----- Message from BRITNI Akbar sent at 7/31/2018  1:04 PM EDT -----  Start vitamin d 400 iu daily, call in # [phone] with 5 refills. Repeat tsh in 3 months. Continue current dose of synthroid.    Spoke with trents mom and called in med    Female

## 2022-11-11 NOTE — H&P ADULT - NSHPLABSRESULTS_GEN_ALL_CORE
Labs:  CAPILLARY BLOOD GLUCOSE      POCT Blood Glucose.: 232 mg/dL (10 Nov 2022 19:55)                          12.1   6.75  )-----------( 172      ( 10 Nov 2022 20:22 )             36.9       Auto Neutrophil %: 73.5 % (11-10-22 @ 20:22)  Auto Immature Granulocyte %: 1.6 % (11-10-22 @ 20:22)    11-10    135  |  99  |  34<H>  ----------------------------<  246<H>  4.7   |  24  |  1.2      Calcium, Total Serum: 9.0 mg/dL (11-10-22 @ 20:22)      LFTs:             6.9  | 0.2  | 19       ------------------[84      ( 10 Nov 2022 20:22 )  4.2  | x    | 9           Lipase:53     Amylase:x         Lactate, Blood: 1.5 mmol/L (11-10-22 @ 20:22)      Coags:     12.10  ----< 1.06    ( 10 Nov 2022 20:22 )     30.4              Alcohol, Blood: <10 mg/dL (11-10-22 @ 20:22)    Urinalysis Basic - ( 10 Nov 2022 22:56 )    Color: Light Yellow / Appearance: Clear / S.028 / pH: x  Gluc: x / Ketone: Negative  / Bili: Negative / Urobili: <2 mg/dL   Blood: x / Protein: 30 mg/dL / Nitrite: Negative   Leuk Esterase: Large / RBC: 3 /HPF / WBC 9 /HPF   Sq Epi: x / Non Sq Epi: 4 /HPF / Bacteria: Many            Alcohol, Blood: <10 mg/dL (11-10-22 @ 20:22)      RADIOLOGY & ADDITIONAL STUDIES:  ---------------------------------------------------------------------------------------  `< from: CT Head No Cont (11.10.22 @ 21:56) >  Impression:  CT head:  No evidence of acute intracranial abnormality.  CT cervical spine:  No evidence of acute fracture of the cervical spine.    < end of copied text >  < from: CT Chest w/ IV Cont (11.10.22 @ 22:23) >  IMPRESSION:    Acute subcapital impacted and angulated right femoral neck fracture.    Acute compression fracture of T12. No significant retropulsion identified.    No evidence for acute traumatic solid organ injury.    3.7 cm left ovarian cyst. Continued ultrasound follow-up is recommended   in this age group.    --- End of Report -    < end of copied text >

## 2022-11-11 NOTE — PRE PROCEDURE NOTE - PRE PROCEDURE EVALUATION
ORTHOPEDIC SURGERY PRE OP NOTE    Diagnosis: right femoral neck fracture    Planned Procedure: right hip hemiartrhoplasty    Consent: TO BE OBTAINED BY ATTENDING                   Risks/benefits/alternatives were discussed with the patient/family and they wish to proceed with surgery.       ANTICIPATED DATE OF PROCEDURE : 11/11/22  SCHEDULED CASE OR ADD-ON CASE: add-on        Clearances:   [ ] Surgery  [ ] Medicine    T(C): 36.4 (11-10-22 @ 19:35), Max: 36.4 (11-10-22 @ 19:35)  HR: 60 (11-10-22 @ 19:35) (60 - 60)  BP: 187/77 (11-10-22 @ 19:35) (187/77 - 187/77)  RR: 18 (11-10-22 @ 19:35) (18 - 18)  SpO2: 100% (11-10-22 @ 19:35) (100% - 100%)    Labs:                        12.1   6.75  )-----------( 172      ( 10 Nov 2022 20:22 )             36.9     11-10    135  |  99  |  34<H>  ----------------------------<  246<H>  4.7   |  24  |  1.2    Ca    9.0      10 Nov 2022 20:22    TPro  6.9  /  Alb  4.2  /  TBili  0.2  /  DBili  x   /  AST  19  /  ALT  9   /  AlkPhos  84  11-10    PT/INR - ( 10 Nov 2022 20:22 )   PT: 12.10 sec;   INR: 1.06 ratio         PTT - ( 10 Nov 2022 20:22 )  PTT:30.4 sec  Type and Screen X 2:    [x]EKG:   [x]CXR:     DIET: NPO after midnight  IVF: per primary team    ANTICOAGULATION STATUS ( include name of anticoagulant) :  hold DVT chemoppx on day of surgery                                         A/P: Patient is a 89y y/o Female Pending right hip hemiarthroplasty 11/11/22    [ ] -NPO and IVF @ midnight  [ ]pain control/analgesia prn per primary team   [ ]Incentive Spirometry   [ ]F/U Clearance  [ ]F/U Pending Labs  [ ]Notify Ortho with any questions- spectra [id number]    [ ]DISCUSSED WITH PRIMARY TEAM MEMBER (name of team member): Dr Palma  [ ]Date and Time DISCUSSED WITH PRIMARY TEAM MEMBER: 11/10/22 1159pm

## 2022-11-11 NOTE — PROGRESS NOTE ADULT - ASSESSMENT
s/p right hip jovita pod 0     pain control   dvt proph   needs 1 month post op   abx 24 hours   am labs   wbat with walker   incentive   pt rehab   medical management   dispo planning

## 2022-11-11 NOTE — PRE-ANESTHESIA EVALUATION ADULT - NSANTHPMHFT_GEN_ALL_CORE
89 yo female, PMHx of DM, CAD, DLD, HTN, presents s/p mechanical fall onto her right side, complaining of right hip, non-radiating, constant, sharp, no alleviating factors, aggravated by movement; concurrent head trauma, denies headache, LOC, AC use, nausea, vomiting, dizziness, chest pain, shortness of breath, abdominal pain.

## 2022-11-11 NOTE — CONSULT NOTE ADULT - SUBJECTIVE AND OBJECTIVE BOX
HISTORY OF PRESENT ILLNESS:   89 yo female, PMHx of DM, CAD, DLD, HTN, presents s/p mechanical fall onto her right side, complaining of right hip, non-radiating, constant, sharp, no alleviating factors, aggravated by movement, no associated symptoms, sudden onset 6:30 pm at NH when she was walking without her walker and she tripped. She also hit her head. Denies headache, LOC, AC use, nausea, vomiting, dizziness, chest pain, shortness of breath, abdominal pain. At baseline, patient cannot ambulate more than 4 blocks or up 2 flight of stairs.    PAST MEDICAL & SURGICAL HISTORY  Diabetes    CAD (coronary artery disease)    Hypertension    Hyperlipidemia        FAMILY HISTORY:  FAMILY HISTORY:      SOCIAL HISTORY:  []smoker  []Alcohol  []Drug    ROS:  Negative except as mentioned in HPI    ALLERGIES:  codeine (Unknown)      MEDICATIONS:  MEDICATIONS  (STANDING):  acetaminophen     Tablet .. 650 milliGRAM(s) Oral every 6 hours  aspirin enteric coated 81 milliGRAM(s) Oral daily  dextrose 5%. 1000 milliLiter(s) (50 mL/Hr) IV Continuous <Continuous>  dextrose 5%. 1000 milliLiter(s) (100 mL/Hr) IV Continuous <Continuous>  dextrose 50% Injectable 25 Gram(s) IV Push once  dextrose 50% Injectable 12.5 Gram(s) IV Push once  dextrose 50% Injectable 25 Gram(s) IV Push once  furosemide    Tablet 20 milliGRAM(s) Oral daily  glucagon  Injectable 1 milliGRAM(s) IntraMuscular once  heparin   Injectable 5000 Unit(s) SubCutaneous every 8 hours  insulin lispro (ADMELOG) corrective regimen sliding scale   SubCutaneous three times a day before meals  lactated ringers. 1000 milliLiter(s) (75 mL/Hr) IV Continuous <Continuous>  lidocaine   4% Patch 1 Patch Transdermal every 24 hours  metoprolol tartrate 50 milliGRAM(s) Oral two times a day  pantoprazole   Suspension 40 milliGRAM(s) Oral daily  ranolazine 500 milliGRAM(s) Oral two times a day  senna 2 Tablet(s) Oral at bedtime    MEDICATIONS  (PRN):  dextrose Oral Gel 15 Gram(s) Oral once PRN Blood Glucose LESS THAN 70 milliGRAM(s)/deciliter      HOME MEDICATIONS:  Home Medications:  Aspir 81 oral delayed release tablet: 1 tab(s) orally once a day (03 Aug 2018 10:41)  furosemide 20 mg oral tablet: 1 tab(s) orally once a day (03 Aug 2018 10:41)  gemfibrozil 600 mg oral tablet: 1 tab(s) orally 2 times a day (03 Aug 2018 10:41)  glipiZIDE 10 mg oral tablet: 1 tab(s) orally once a day (03 Aug 2018 10:41)  metFORMIN 500 mg oral tablet: 1 tab(s) orally 2 times a day (03 Aug 2018 10:41)  metoprolol tartrate 50 mg oral tablet: 1 tab(s) orally 2 times a day (03 Aug 2018 10:41)  nabumetone 750 mg oral tablet: 2 tab(s) orally once a day (03 Aug 2018 10:41)  pravastatin 40 mg oral tablet: 1 tab(s) orally once a day (03 Aug 2018 10:41)  Protonix 40 mg oral granule, delayed release: 1 each orally once a day (03 Aug 2018 10:41)  Ranexa 500 mg oral tablet, extended release: 1 tab(s) orally 2 times a day (03 Aug 2018 10:41)      VITALS:   T(F): 98.2 (11-11 @ 08:58), Max: 98.2 (11-11 @ 08:58)  HR: 63 (11-11 @ 08:58) (60 - 74)  BP: 189/80 (11-11 @ 08:58) (166/66 - 189/80)  BP(mean): 115 (11-11 @ 08:58) (95 - 115)  RR: 18 (11-11 @ 08:58) (18 - 18)  SpO2: 99% (11-11 @ 08:58) (99% - 100%)    I&O's Summary      LABS:                        12.1   6.75  )-----------( 172      ( 10 Nov 2022 20:22 )             36.9     11-10    135  |  99  |  34<H>  ----------------------------<  246<H>  4.7   |  24  |  1.2    Ca    9.0      10 Nov 2022 20:22    TPro  6.9  /  Alb  4.2  /  TBili  0.2  /  DBili  x   /  AST  19  /  ALT  9   /  AlkPhos  84  11-10    PT/INR - ( 10 Nov 2022 20:22 )   PT: 12.10 sec;   INR: 1.06 ratio         PTT - ( 10 Nov 2022 20:22 )  PTT:30.4 sec  Lactate, Blood: 1.5 mmol/L (11-10-22 @ 20:22)

## 2022-11-11 NOTE — CONSULT NOTE ADULT - ASSESSMENT
* Patient-based characteristics (Functional capacity)  Patient is able to achieve more than 4 MET (walk 4 blocks, climb 2 flights of stairs, etc...)          Y [] / N [x]    High-risk patient features:  - Recent (<30 days) or active MI          Y [] / N [X]  - Unstable or severe angina          Y [] / N [X]  - Decompensated heart failure, or worsening or new-onset heart failure          Y [] / N [X]  - Severe valvular disease          Y [] / N [X]  - Significant arrhythmia (Tachy- or Bradyarrhythmia)          Y [] / N [X]    * Surgery/Procedure-based characteristics (Type of surgery)  - Low-risk procedure (outpatient procedure, elective, endoscopy, etc...)          Y [] / N []  - Elevated or Moderate-risk procedure (Inpatient)          Y [x] / N []  - High-risk procedure (urgent/emergent procedure, Intrathoracic, vascular, etc...)          Y [] / N []    * Revised Cardiac Risk Index (RCRI)  1- History of ischemic heart disease          Y [x] / N [X]  2- History of congestive heart failure          Y [] / N [X]  3- History of stroke/TIA          Y [] / N [X]  4- History of insulin-dependent diabetes          Y [] / N [X]  5- Chronic kidney disease (Cr >2mg/dL)          Y [] / N [X]  6- Undergoing suprainguinal vascular, intraperitoneal, or intrathoracic surgery          Y [] / N [X]      * IMPRESSION & RECOMMENDATIONS:  High risk patient for a moderate risk surgery/procedure  No further cardiac work-up is needed at the moment, as testing is unlikely to . * Patient-based characteristics (Functional capacity)  Patient is able to achieve more than 4 MET (walk 4 blocks, climb 2 flights of stairs, etc...)          Y [] / N [x]    High-risk patient features:  - Recent (<30 days) or active MI          Y [] / N [X]  - Unstable or severe angina          Y [] / N [X]  - Decompensated heart failure, or worsening or new-onset heart failure          Y [] / N [X]  - Severe valvular disease          Y [] / N [X]  - Significant arrhythmia (Tachy- or Bradyarrhythmia)          Y [] / N [X]    * Surgery/Procedure-based characteristics (Type of surgery)  - Low-risk procedure (outpatient procedure, elective, endoscopy, etc...)          Y [] / N []  - Elevated or Moderate-risk procedure (Inpatient)          Y [x] / N []  - High-risk procedure (urgent/emergent procedure, Intrathoracic, vascular, etc...)          Y [] / N []    * Revised Cardiac Risk Index (RCRI)  1- History of ischemic heart disease          Y [x] / N [X]  2- History of congestive heart failure          Y [] / N [X]  3- History of stroke/TIA          Y [] / N [X]  4- History of insulin-dependent diabetes          Y [] / N [X]  5- Chronic kidney disease (Cr >2mg/dL)          Y [] / N [X]  6- Undergoing suprainguinal vascular, intraperitoneal, or intrathoracic surgery          Y [] / N [X]      * IMPRESSION & RECOMMENDATIONS:  High risk patient for a moderate risk surgery/procedure  No further cardiac work-up is needed at the moment, as testing is unlikely to .    I have personally seen and examined her on 11/12/2022.  I agree with cardiology assessment and plan.

## 2022-11-11 NOTE — CHART NOTE - NSCHARTNOTEFT_GEN_A_CORE
PACU ANESTHESIA ADMISSION NOTE      Procedure:   Post op diagnosis:      ____  Intubated  TV:______       Rate: ______      FiO2: ______    _x___  Patent Airway    _x___  Full return of protective reflexes    _x___  Full recovery from anesthesia / back to baseline status    Vitals:  T 98.3 f  HR: 81  BP: 200/80 (baseline)  RR: 16  SpO2: 98% on NC 3 L/min    Mental Status:  _x___ Awake   __x___ Alert   _____ Drowsy   _____ Sedated    Nausea/Vomiting:  _x___  NO       ______Yes,   See Post - Op Orders         Pain Scale (0-10):  __0___    Treatment: _x___ None    ____ See Post - Op/PCA Orders    Post - Operative Fluids:   ____ Oral   __x__ See Post - Op Orders    Plan: Discharge:   ____Home       __x___Floor     _____Critical Care    _____  Other:_________________    Comments: attempted spinal anesthesia with no success; uneventful GETA, VSS, full report to pacu rn  No anesthesia issues or complications noted.  Discharge when criteria met.

## 2022-11-11 NOTE — H&P ADULT - HISTORY OF PRESENT ILLNESS
`TRAUMA ACTIVATION LEVEL:  CODE / ALERT  / CONSULT  ACTIVATED BY: EMS**  /  ED**  INTUBATED: YES** / NO**    MECHANISM OF INJURY:   [] Blunt     [] MVC	  [x] Fall	  [] Pedestrian Struck	  [] Motorcycle     [] Assault     [] Bicycle collision    [] Sports injury    [] Penetrating    [] Gun Shot Wound      [] Stab Wound    GCS: 15 	E: 4	V: 5	M: 6    HPI: "89 yo female, PMHx of DM, CAD, DLD, HTN, presents s/p mechanical fall onto her right side, complaining of right hip, non-radiating, constant, sharp, no alleviating factors, aggravated by movement, no associated symptoms, sudden onset 6:30 pm at NH when she was walking without her walker and she tripped. She also hit her head. Denies headache, LOC, AC use, nausea, vomiting, dizziness, chest pain, shortness of breath, abdominal pain"    Trauma assessment in ED: ABCs intact , GCS 15 , AAOx3.        PAST MEDICAL & SURGICAL HISTORY:  Diabetes      CAD (coronary artery disease)      Hypertension      Hyperlipidemia        Allergies    codeine (Unknown)    Intolerances      Home Medications:  Aspir 81 oral delayed release tablet: 1 tab(s) orally once a day (03 Aug 2018 10:41)  furosemide 20 mg oral tablet: 1 tab(s) orally once a day (03 Aug 2018 10:41)  gemfibrozil 600 mg oral tablet: 1 tab(s) orally 2 times a day (03 Aug 2018 10:41)  glipiZIDE 10 mg oral tablet: 1 tab(s) orally once a day (03 Aug 2018 10:41)  metFORMIN 500 mg oral tablet: 1 tab(s) orally 2 times a day (03 Aug 2018 10:41)  metoprolol tartrate 50 mg oral tablet: 1 tab(s) orally 2 times a day (03 Aug 2018 10:41)  nabumetone 750 mg oral tablet: 2 tab(s) orally once a day (03 Aug 2018 10:41)  pravastatin 40 mg oral tablet: 1 tab(s) orally once a day (03 Aug 2018 10:41)  Protonix 40 mg oral granule, delayed release: 1 each orally once a day (03 Aug 2018 10:41)  Ranexa 500 mg oral tablet, extended release: 1 tab(s) orally 2 times a day (03 Aug 2018 10:41)      ROS: 10-system review is otherwise negative except HPI above.      Primary Survey:    A - airway intact  B - bilateral breath sounds and good chest rise  C - palpable pulses in all extremities  D - GCS 15 on arrival, DONOVAN  Exposure obtained    Vital Signs Last 24 Hrs  T(C): 36.1 (11 Nov 2022 02:31), Max: 36.4 (10 Nov 2022 19:35)  T(F): 96.9 (11 Nov 2022 02:31), Max: 97.6 (10 Nov 2022 19:35)  HR: 74 (11 Nov 2022 02:31) (60 - 74)  BP: 166/66 (11 Nov 2022 02:31) (166/66 - 187/77)  BP(mean): 95 (11 Nov 2022 02:31) (95 - 95)  RR: 18 (11 Nov 2022 02:31) (18 - 18)  SpO2: 99% (11 Nov 2022 02:31) (99% - 100%)    Parameters below as of 11 Nov 2022 02:31  Patient On (Oxygen Delivery Method): room air

## 2022-11-12 LAB
ANION GAP SERPL CALC-SCNC: 15 MMOL/L — HIGH (ref 7–14)
ANION GAP SERPL CALC-SCNC: 16 MMOL/L — HIGH (ref 7–14)
ANION GAP SERPL CALC-SCNC: 16 MMOL/L — HIGH (ref 7–14)
BASOPHILS # BLD AUTO: 0.05 K/UL — SIGNIFICANT CHANGE UP (ref 0–0.2)
BASOPHILS NFR BLD AUTO: 0.5 % — SIGNIFICANT CHANGE UP (ref 0–1)
BUN SERPL-MCNC: 33 MG/DL — HIGH (ref 10–20)
BUN SERPL-MCNC: 35 MG/DL — HIGH (ref 10–20)
BUN SERPL-MCNC: 36 MG/DL — HIGH (ref 10–20)
CALCIUM SERPL-MCNC: 8.1 MG/DL — LOW (ref 8.4–10.5)
CALCIUM SERPL-MCNC: 8.2 MG/DL — LOW (ref 8.4–10.5)
CALCIUM SERPL-MCNC: 8.3 MG/DL — LOW (ref 8.4–10.4)
CHLORIDE SERPL-SCNC: 106 MMOL/L — SIGNIFICANT CHANGE UP (ref 98–110)
CHLORIDE SERPL-SCNC: 106 MMOL/L — SIGNIFICANT CHANGE UP (ref 98–110)
CHLORIDE SERPL-SCNC: 97 MMOL/L — LOW (ref 98–110)
CO2 SERPL-SCNC: 21 MMOL/L — SIGNIFICANT CHANGE UP (ref 17–32)
CO2 SERPL-SCNC: 22 MMOL/L — SIGNIFICANT CHANGE UP (ref 17–32)
CO2 SERPL-SCNC: 22 MMOL/L — SIGNIFICANT CHANGE UP (ref 17–32)
CREAT SERPL-MCNC: 1.6 MG/DL — HIGH (ref 0.7–1.5)
CREAT SERPL-MCNC: 1.8 MG/DL — HIGH (ref 0.7–1.5)
CREAT SERPL-MCNC: 1.9 MG/DL — HIGH (ref 0.7–1.5)
EGFR: 25 ML/MIN/1.73M2 — LOW
EGFR: 26 ML/MIN/1.73M2 — LOW
EGFR: 30 ML/MIN/1.73M2 — LOW
EOSINOPHIL # BLD AUTO: 0.11 K/UL — SIGNIFICANT CHANGE UP (ref 0–0.7)
EOSINOPHIL NFR BLD AUTO: 1.2 % — SIGNIFICANT CHANGE UP (ref 0–8)
GLUCOSE BLDC GLUCOMTR-MCNC: 120 MG/DL — HIGH (ref 70–99)
GLUCOSE BLDC GLUCOMTR-MCNC: 158 MG/DL — HIGH (ref 70–99)
GLUCOSE BLDC GLUCOMTR-MCNC: 200 MG/DL — HIGH (ref 70–99)
GLUCOSE BLDC GLUCOMTR-MCNC: 221 MG/DL — HIGH (ref 70–99)
GLUCOSE SERPL-MCNC: 150 MG/DL — HIGH (ref 70–99)
GLUCOSE SERPL-MCNC: 205 MG/DL — HIGH (ref 70–99)
GLUCOSE SERPL-MCNC: 223 MG/DL — HIGH (ref 70–99)
HCT VFR BLD CALC: 28.8 % — LOW (ref 37–47)
HCT VFR BLD CALC: 29.5 % — LOW (ref 37–47)
HCT VFR BLD CALC: 29.6 % — LOW (ref 37–47)
HGB BLD-MCNC: 9.5 G/DL — LOW (ref 12–16)
HGB BLD-MCNC: 9.7 G/DL — LOW (ref 12–16)
HGB BLD-MCNC: 9.8 G/DL — LOW (ref 12–16)
IMM GRANULOCYTES NFR BLD AUTO: 0.4 % — HIGH (ref 0.1–0.3)
LYMPHOCYTES # BLD AUTO: 1.16 K/UL — LOW (ref 1.2–3.4)
LYMPHOCYTES # BLD AUTO: 12.3 % — LOW (ref 20.5–51.1)
MAGNESIUM SERPL-MCNC: 1.6 MG/DL — LOW (ref 1.8–2.4)
MAGNESIUM SERPL-MCNC: 1.7 MG/DL — LOW (ref 1.8–2.4)
MCHC RBC-ENTMCNC: 32.7 PG — HIGH (ref 27–31)
MCHC RBC-ENTMCNC: 32.7 PG — HIGH (ref 27–31)
MCHC RBC-ENTMCNC: 32.8 G/DL — SIGNIFICANT CHANGE UP (ref 32–37)
MCHC RBC-ENTMCNC: 33 G/DL — SIGNIFICANT CHANGE UP (ref 32–37)
MCHC RBC-ENTMCNC: 33.2 G/DL — SIGNIFICANT CHANGE UP (ref 32–37)
MCHC RBC-ENTMCNC: 33.2 PG — HIGH (ref 27–31)
MCV RBC AUTO: 100.7 FL — HIGH (ref 81–99)
MCV RBC AUTO: 98.3 FL — SIGNIFICANT CHANGE UP (ref 81–99)
MCV RBC AUTO: 99.7 FL — HIGH (ref 81–99)
MONOCYTES # BLD AUTO: 1.01 K/UL — HIGH (ref 0.1–0.6)
MONOCYTES NFR BLD AUTO: 10.7 % — HIGH (ref 1.7–9.3)
NEUTROPHILS # BLD AUTO: 7.07 K/UL — HIGH (ref 1.4–6.5)
NEUTROPHILS NFR BLD AUTO: 74.9 % — SIGNIFICANT CHANGE UP (ref 42.2–75.2)
NRBC # BLD: 0 /100 WBCS — SIGNIFICANT CHANGE UP (ref 0–0)
PHOSPHATE SERPL-MCNC: 2.7 MG/DL — SIGNIFICANT CHANGE UP (ref 2.1–4.9)
PHOSPHATE SERPL-MCNC: 3.1 MG/DL — SIGNIFICANT CHANGE UP (ref 2.1–4.9)
PLATELET # BLD AUTO: 137 K/UL — SIGNIFICANT CHANGE UP (ref 130–400)
PLATELET # BLD AUTO: 140 K/UL — SIGNIFICANT CHANGE UP (ref 130–400)
PLATELET # BLD AUTO: 153 K/UL — SIGNIFICANT CHANGE UP (ref 130–400)
POTASSIUM SERPL-MCNC: 3.6 MMOL/L — SIGNIFICANT CHANGE UP (ref 3.5–5)
POTASSIUM SERPL-MCNC: 4.3 MMOL/L — SIGNIFICANT CHANGE UP (ref 3.5–5)
POTASSIUM SERPL-MCNC: 4.7 MMOL/L — SIGNIFICANT CHANGE UP (ref 3.5–5)
POTASSIUM SERPL-SCNC: 3.6 MMOL/L — SIGNIFICANT CHANGE UP (ref 3.5–5)
POTASSIUM SERPL-SCNC: 4.3 MMOL/L — SIGNIFICANT CHANGE UP (ref 3.5–5)
POTASSIUM SERPL-SCNC: 4.7 MMOL/L — SIGNIFICANT CHANGE UP (ref 3.5–5)
RBC # BLD: 2.86 M/UL — LOW (ref 4.2–5.4)
RBC # BLD: 2.97 M/UL — LOW (ref 4.2–5.4)
RBC # BLD: 3 M/UL — LOW (ref 4.2–5.4)
RBC # FLD: 14 % — SIGNIFICANT CHANGE UP (ref 11.5–14.5)
RBC # FLD: 14.2 % — SIGNIFICANT CHANGE UP (ref 11.5–14.5)
RBC # FLD: 14.2 % — SIGNIFICANT CHANGE UP (ref 11.5–14.5)
SODIUM SERPL-SCNC: 134 MMOL/L — LOW (ref 135–146)
SODIUM SERPL-SCNC: 143 MMOL/L — SIGNIFICANT CHANGE UP (ref 135–146)
SODIUM SERPL-SCNC: 144 MMOL/L — SIGNIFICANT CHANGE UP (ref 135–146)
WBC # BLD: 10.71 K/UL — SIGNIFICANT CHANGE UP (ref 4.8–10.8)
WBC # BLD: 10.76 K/UL — SIGNIFICANT CHANGE UP (ref 4.8–10.8)
WBC # BLD: 9.44 K/UL — SIGNIFICANT CHANGE UP (ref 4.8–10.8)
WBC # FLD AUTO: 10.71 K/UL — SIGNIFICANT CHANGE UP (ref 4.8–10.8)
WBC # FLD AUTO: 10.76 K/UL — SIGNIFICANT CHANGE UP (ref 4.8–10.8)
WBC # FLD AUTO: 9.44 K/UL — SIGNIFICANT CHANGE UP (ref 4.8–10.8)

## 2022-11-12 PROCEDURE — 93306 TTE W/DOPPLER COMPLETE: CPT | Mod: 26

## 2022-11-12 PROCEDURE — 99232 SBSQ HOSP IP/OBS MODERATE 35: CPT

## 2022-11-12 PROCEDURE — 71045 X-RAY EXAM CHEST 1 VIEW: CPT | Mod: 26

## 2022-11-12 PROCEDURE — 76770 US EXAM ABDO BACK WALL COMP: CPT | Mod: 26

## 2022-11-12 RX ORDER — CEFTRIAXONE 500 MG/1
1000 INJECTION, POWDER, FOR SOLUTION INTRAMUSCULAR; INTRAVENOUS EVERY 24 HOURS
Refills: 0 | Status: COMPLETED | OUTPATIENT
Start: 2022-11-12 | End: 2022-11-14

## 2022-11-12 RX ORDER — LANOLIN ALCOHOL/MO/W.PET/CERES
3 CREAM (GRAM) TOPICAL ONCE
Refills: 0 | Status: COMPLETED | OUTPATIENT
Start: 2022-11-12 | End: 2022-11-12

## 2022-11-12 RX ORDER — MAGNESIUM SULFATE 500 MG/ML
2 VIAL (ML) INJECTION
Refills: 0 | Status: COMPLETED | OUTPATIENT
Start: 2022-11-12 | End: 2022-11-13

## 2022-11-12 RX ORDER — ALPRAZOLAM 0.25 MG
0.25 TABLET ORAL ONCE
Refills: 0 | Status: DISCONTINUED | OUTPATIENT
Start: 2022-11-12 | End: 2022-11-12

## 2022-11-12 RX ORDER — SODIUM,POTASSIUM PHOSPHATES 278-250MG
1 POWDER IN PACKET (EA) ORAL ONCE
Refills: 0 | Status: COMPLETED | OUTPATIENT
Start: 2022-11-12 | End: 2022-11-13

## 2022-11-12 RX ORDER — SODIUM CHLORIDE 9 MG/ML
1000 INJECTION, SOLUTION INTRAVENOUS
Refills: 0 | Status: DISCONTINUED | OUTPATIENT
Start: 2022-11-12 | End: 2022-11-14

## 2022-11-12 RX ORDER — METOCLOPRAMIDE HCL 10 MG
10 TABLET ORAL ONCE
Refills: 0 | Status: COMPLETED | OUTPATIENT
Start: 2022-11-12 | End: 2022-11-12

## 2022-11-12 RX ORDER — ONDANSETRON 8 MG/1
4 TABLET, FILM COATED ORAL ONCE
Refills: 0 | Status: COMPLETED | OUTPATIENT
Start: 2022-11-12 | End: 2022-11-12

## 2022-11-12 RX ORDER — ONDANSETRON 8 MG/1
4 TABLET, FILM COATED ORAL EVERY 6 HOURS
Refills: 0 | Status: DISCONTINUED | OUTPATIENT
Start: 2022-11-12 | End: 2022-11-15

## 2022-11-12 RX ORDER — SODIUM CHLORIDE 9 MG/ML
1000 INJECTION INTRAMUSCULAR; INTRAVENOUS; SUBCUTANEOUS
Refills: 0 | Status: DISCONTINUED | OUTPATIENT
Start: 2022-11-12 | End: 2022-11-12

## 2022-11-12 RX ORDER — SODIUM CHLORIDE 9 MG/ML
1000 INJECTION, SOLUTION INTRAVENOUS
Refills: 0 | Status: DISCONTINUED | OUTPATIENT
Start: 2022-11-12 | End: 2022-11-12

## 2022-11-12 RX ADMIN — Medication 650 MILLIGRAM(S): at 05:52

## 2022-11-12 RX ADMIN — Medication 0.25 MILLIGRAM(S): at 23:37

## 2022-11-12 RX ADMIN — RANOLAZINE 500 MILLIGRAM(S): 500 TABLET, FILM COATED, EXTENDED RELEASE ORAL at 17:36

## 2022-11-12 RX ADMIN — Medication 3 MILLIGRAM(S): at 21:21

## 2022-11-12 RX ADMIN — SENNA PLUS 2 TABLET(S): 8.6 TABLET ORAL at 21:21

## 2022-11-12 RX ADMIN — Medication 10 MILLIGRAM(S): at 17:38

## 2022-11-12 RX ADMIN — HEPARIN SODIUM 5000 UNIT(S): 5000 INJECTION INTRAVENOUS; SUBCUTANEOUS at 05:18

## 2022-11-12 RX ADMIN — Medication 650 MILLIGRAM(S): at 14:45

## 2022-11-12 RX ADMIN — Medication 650 MILLIGRAM(S): at 05:18

## 2022-11-12 RX ADMIN — Medication 650 MILLIGRAM(S): at 17:50

## 2022-11-12 RX ADMIN — HEPARIN SODIUM 5000 UNIT(S): 5000 INJECTION INTRAVENOUS; SUBCUTANEOUS at 21:21

## 2022-11-12 RX ADMIN — LIDOCAINE 1 PATCH: 4 CREAM TOPICAL at 17:32

## 2022-11-12 RX ADMIN — Medication 650 MILLIGRAM(S): at 17:36

## 2022-11-12 RX ADMIN — Medication 20 MILLIGRAM(S): at 05:17

## 2022-11-12 RX ADMIN — ONDANSETRON 4 MILLIGRAM(S): 8 TABLET, FILM COATED ORAL at 09:37

## 2022-11-12 RX ADMIN — Medication 650 MILLIGRAM(S): at 15:15

## 2022-11-12 RX ADMIN — Medication 50 MILLIGRAM(S): at 05:17

## 2022-11-12 RX ADMIN — HEPARIN SODIUM 5000 UNIT(S): 5000 INJECTION INTRAVENOUS; SUBCUTANEOUS at 14:44

## 2022-11-12 RX ADMIN — Medication 25 GRAM(S): at 23:36

## 2022-11-12 RX ADMIN — PANTOPRAZOLE SODIUM 40 MILLIGRAM(S): 20 TABLET, DELAYED RELEASE ORAL at 14:43

## 2022-11-12 RX ADMIN — CEFTRIAXONE 100 MILLIGRAM(S): 500 INJECTION, POWDER, FOR SOLUTION INTRAMUSCULAR; INTRAVENOUS at 14:47

## 2022-11-12 RX ADMIN — Medication 81 MILLIGRAM(S): at 14:46

## 2022-11-12 RX ADMIN — Medication 650 MILLIGRAM(S): at 23:34

## 2022-11-12 RX ADMIN — Medication 6: at 08:05

## 2022-11-12 RX ADMIN — LIDOCAINE 1 PATCH: 4 CREAM TOPICAL at 14:43

## 2022-11-12 RX ADMIN — Medication 100 MILLIGRAM(S): at 05:15

## 2022-11-12 RX ADMIN — Medication 4: at 17:36

## 2022-11-12 RX ADMIN — RANOLAZINE 500 MILLIGRAM(S): 500 TABLET, FILM COATED, EXTENDED RELEASE ORAL at 05:15

## 2022-11-12 RX ADMIN — Medication 50 MILLIGRAM(S): at 17:36

## 2022-11-12 NOTE — CONSULT NOTE ADULT - SUBJECTIVE AND OBJECTIVE BOX
HPI:  "89 yo female, PMHx of DM, CAD, DLD, HTN, presents s/p mechanical fall onto her right side, complaining of right hip, non-radiating, constant, sharp, no alleviating factors, aggravated by movement, no associated symptoms, sudden onset 6:30 pm at NH when she was walking without her walker and she tripped. She also hit her head. Denies headache, LOC, AC use, nausea, vomiting, dizziness, chest pain, shortness of breath, abdominal pain"    Trauma assessment in ED: ABCs intact , GCS 15 , AAOx3.    ptn  had  rt  hip  fx  sp  orif  rt hip  seen at  bed  side  [facility]      PAST MEDICAL & SURGICAL HISTORY:  Diabetes      CAD (coronary artery disease)      Hypertension      Hyperlipidemia        Allergies    codeine (Unknown)    Intolerances      Home Medications:  Aspir 81 oral delayed release tablet: 1 tab(s) orally once a day (03 Aug 2018 10:41)  furosemide 20 mg oral tablet: 1 tab(s) orally once a day (03 Aug 2018 10:41)  gemfibrozil 600 mg oral tablet: 1 tab(s) orally 2 times a day (03 Aug 2018 10:41)  glipiZIDE 10 mg oral tablet: 1 tab(s) orally once a day (03 Aug 2018 10:41)  metFORMIN 500 mg oral tablet: 1 tab(s) orally 2 times a day (03 Aug 2018 10:41)  metoprolol tartrate 50 mg oral tablet: 1 tab(s) orally 2 times a day (03 Aug 2018 10:41)  nabumetone 750 mg oral tablet: 2 tab(s) orally once a day (03 Aug 2018 10:41)  pravastatin 40 mg oral tablet: 1 tab(s) orally once a day (03 Aug 2018 10:41)  Protonix 40 mg oral granule, delayed release: 1 each orally once a day (03 Aug 2018 10:41)  Ranexa 500 mg oral tablet, extended release: 1 tab(s) orally 2 times a day (03 Aug 2018 10:41)      ROS: 10-system review is otherwise negative except HPI above.      Primary Survey:    A - airway intact  B - bilateral breath sounds and good chest rise  C - palpable pulses in all extremities  D - GCS 15 on arrival, DONOVAN  Exposure obtained    Vital Signs Last 24 Hrs  T(C): 36.1 (2022 02:31), Max: 36.4 (10 Nov 2022 19:35)  T(F): 96.9 (2022 02:31), Max: 97.6 (10 Nov 2022 19:35)  HR: 74 (:) (60 - 74)  BP: 166/66 (2022 02:31) (166/66 - 187/77)  BP(mean): 95 (:) (95 - 95)  RR: 18 () (18 - 18)  SpO2: 99% (:) (99% - 100%)    Parameters below as of :  Patient On (Oxygen Delivery Method): room air         (2022 04:40)    PTN  REFERRED TO ACUTE  REHAB  FOR  EVAL AND  TX   PAST MEDICAL & SURGICAL HISTORY:  Diabetes      CAD (coronary artery disease)      Hypertension      Hyperlipidemia          Hospital Course:    TODAY'S SUBJECTIVE & REVIEW OF SYMPTOMS:     Constitutional WNL   Cardio WNL   Resp WNL   GI WNL  Heme WNL  Endo WNL  Skin WNL  MSK WNL  Neuro WNL  Cognitive WNL  Psych WNL      MEDICATIONS  (STANDING):  acetaminophen     Tablet .. 650 milliGRAM(s) Oral every 6 hours  aspirin enteric coated 81 milliGRAM(s) Oral daily  cefTRIAXone   IVPB 1000 milliGRAM(s) IV Intermittent every 24 hours  dextrose 50% Injectable 25 Gram(s) IV Push once  dextrose 50% Injectable 12.5 Gram(s) IV Push once  dextrose 50% Injectable 25 Gram(s) IV Push once  furosemide    Tablet 20 milliGRAM(s) Oral daily  glucagon  Injectable 1 milliGRAM(s) IntraMuscular once  heparin   Injectable 5000 Unit(s) SubCutaneous every 8 hours  insulin lispro (ADMELOG) corrective regimen sliding scale   SubCutaneous three times a day before meals  lactated ringers. 1000 milliLiter(s) (100 mL/Hr) IV Continuous <Continuous>  lidocaine   4% Patch 1 Patch Transdermal every 24 hours  metoprolol tartrate 50 milliGRAM(s) Oral two times a day  pantoprazole   Suspension 40 milliGRAM(s) Oral daily  ranolazine 500 milliGRAM(s) Oral two times a day  senna 2 Tablet(s) Oral at bedtime    MEDICATIONS  (PRN):  dextrose Oral Gel 15 Gram(s) Oral once PRN Blood Glucose LESS THAN 70 milliGRAM(s)/deciliter  ondansetron Injectable 4 milliGRAM(s) IV Push every 6 hours PRN Nausea and/or Vomiting      FAMILY HISTORY:      Allergies    codeine (Unknown)    Intolerances        SOCIAL HISTORY:    [  ] Etoh  [  ] Smoking  [  ] Substance abuse     Home Environment:  [  ] Home Alone  [  ] Lives with Family  [  ] Home Health Aid    Dwelling:  [  ] Apartment  [  ] Private House  [  ] Adult Home  [ x ] Skilled Nursing Facility      [  ] Short Term  [ x ] Long Term  [  ] Stairs       Elevator [  ]    FUNCTIONAL STATUS PTA: (Check all that apply)  Ambulation: [x  ]Independent    [  ] Dependent     [  ] Non-Ambulatory  Assistive Device: [  ] SA Cane  [  ]  Q Cane  [ x ] Walker  [  ]  Wheelchair  ADL : [ x ] Independent  [  ]  Dependent       Vital Signs Last 24 Hrs  T(C): 36.4 (2022 12:04), Max: 37.6 (2022 16:42)  T(F): 97.6 (2022 12:04), Max: 98.5 (2022 07:53)  HR: 67 (2022 12:04) (56 - 91)  BP: 149/65 (2022 12:04) (126/56 - 200/76)  BP(mean): 105 (2022 16:42) (105 - 105)  RR: 18 (2022 12:04) (17 - 20)  SpO2: 95% (2022 12:04) (93% - 100%)    Parameters below as of 2022 18:30  Patient On (Oxygen Delivery Method): room air          PHYSICAL EXAM: Alert & Oriented X3  GENERAL: NAD, well-groomed, well-developed  HEAD:  Atraumatic, Normocephalic  EYES: EOMI, PERRLA, conjunctiva and sclera clear  NECK: Supple, No JVD, Normal thyroid  CHEST/LUNG: Clear to percussion bilaterally; No rales, rhonchi, wheezing, or rubs  HEART: Regular rate and rhythm; No murmurs, rubs, or gallops  ABDOMEN: Soft, Nontender, Nondistended; Bowel sounds present  EXTREMITIES:  2+ Peripheral Pulses, No clubbing, cyanosis, or edema    NERVOUS SYSTEM:  Cranial Nerves 2-12 intact [ x] Abnormal  [  ]  ROM: WFL all extremities [  x]  Abnormal [  ]  Motor Strength: WFL all extremities  [x  ]  Abnormal [  ]  Sensation: intact to light touch [x  ] Abnormal [  ]  Reflexes: Symmetric [  x]  Abnormal [  ]    FUNCTIONAL STATUS:  Bed Mobility: Independent [  ]  Supervision [  ]  Needs Assistance [ x ]  N/A [  ]  Transfers: Independent [  ]  Supervision [  ]  Needs Assistance [ x ]  N/A [  ]   Ambulation: Independent [  ]  Supervision [  ]  Needs Assistance [ x ]  N/A [  ]  ADL: Independent [  ] Requires Assistance [  ] N/A [  x]  SEE PT/OT IE NOTES    LABS:                        9.5    10.71 )-----------( 137      ( 2022 07:45 )             28.8     11-12    144  |  106  |  35<H>  ----------------------------<  205<H>  4.7   |  22  |  1.9<H>    Ca    8.1<L>      2022 07:45  Phos  3.1     11-12  Mg     1.7     -12    TPro  6.9  /  Alb  4.2  /  TBili  0.2  /  DBili  x   /  AST  19  /  ALT  9   /  AlkPhos  84  11-10    PT/INR - ( 10 Nov 2022 20:22 )   PT: 12.10 sec;   INR: 1.06 ratio         PTT - ( 10 Nov 2022 20:22 )  PTT:30.4 sec  Urinalysis Basic - ( 10 Nov 2022 22:56 )    Color: Light Yellow / Appearance: Clear / S.028 / pH: x  Gluc: x / Ketone: Negative  / Bili: Negative / Urobili: <2 mg/dL   Blood: x / Protein: 30 mg/dL / Nitrite: Negative   Leuk Esterase: Large / RBC: 3 /HPF / WBC 9 /HPF   Sq Epi: x / Non Sq Epi: 4 /HPF / Bacteria: Many        RADIOLOGY & ADDITIONAL STUDIES: < from: Xray Femur 2 Views, Right (11.10.22 @ 21:19) >  impression:    Acute right femoral neck fracture demonstrating 0.8 cm impaction/superior   displacement and varus angulation. The remainder right femur is intact.    There are moderate degenerative changes of the right hip, right   sacroiliac joint and pubic symphysis. Moderate/severe tricompartmental   osteoarthritis of the right knee is noted. Small knee joint effusion.   There are extensive vascular calcifications.      < end of copied text >      Assesment:

## 2022-11-12 NOTE — PHYSICAL THERAPY INITIAL EVALUATION ADULT - NSACTIVITYREC_GEN_A_PT
discussed patient status pre and post session with AMMON correia
Patient reported she lives at [facility] where she receives rehab. Patient educated on quad sets x30 and encouraged to do daily to improve sit<->stand transfers.

## 2022-11-12 NOTE — PROGRESS NOTE ADULT - ASSESSMENT
s/p right hip jovita pod 1    pain control   dvt proph needs one month post op   pt ot   wbat with walker   discussed iv fluids with trauma team   fu labs cbc bmp trend renal function   med management   dispo planning

## 2022-11-12 NOTE — CONSULT NOTE ADULT - ASSESSMENT
IMPRESSION: Rehab of 89  y.o  f  rehab  for  rt  hip  fx  sp HA    PRECAUTIONS: [  ] Cardiac  [  ] Respiratory  [  ] Seizures [  ] Contact Isolation  [  ] Droplet Isolation  [ FALL ] Other    Weight Bearing Status:     RECOMMENDATION:    Out of Bed to Chair     DVT/Decubiti Prophylaxis    REHAB PLAN:     [ x  ] Bedside P/T 3-5 times a week   [   ]   Bedside O/T  2-3 times a week             [   ] No Rehab Therapy Indicated                   [   ]  Speech Therapy   Conditioning/ROM                                    ADL  Bed Mobility                                               Conditioning/ROM  Transfers                                                     Bed Mobility  Sitting /Standing Balance                         Transfers                                        Gait Training                                               Sitting/Standing Balance  Stair Training [   ]Applicable                    Home equipment Eval                                                                        Splinting  [   ] Only      GOALS:   ADL   [  x ]   Independent                    Transfers  [ x  ] Independent                          Ambulation  [  x ] Independent     [    ] With device                            [   ]  CG                                                         [   ]  CG                                                                  [   ] CG                            [    ] Min A                                                   [   ] Min A                                                              [   ] Min  A          DISCHARGE PLAN:   [   ]  Good candidate for Intensive Rehabilitation/Hospital based-[facility]                                             Will tolerate 3hrs Intensive Rehab Daily                                       [ xx   ]  Short Term Rehab in Skilled Nursing Facility and [facility] care                                       [    ]  Home with Outpatient or  services                                         [    ]  Possible Candidate for Intensive Hospital based Rehab

## 2022-11-12 NOTE — PHYSICAL THERAPY INITIAL EVALUATION ADULT - PHYSICAL ASSIST/NONPHYSICAL ASSIST: SIT/STAND, REHAB EVAL
Therapist stabilized RW while patient grasped handles to stand. Education on safe sequencing was explained to patient.

## 2022-11-12 NOTE — PROGRESS NOTE ADULT - ASSESSMENT
ASSESSMENT:  89 yo female s/p right hip hemiarthroplasty    PLAN:  -Pain control  -WBAT w/ walker  -F/U AM labs  -c/w Ancef post op x2  -DVT ppx  -Encourage incentive spirometry   -Dispo planning  -Pt/Physiatry     [phone]

## 2022-11-12 NOTE — PHYSICAL THERAPY INITIAL EVALUATION ADULT - PERTINENT HX OF CURRENT PROBLEM, REHAB EVAL
89F w/ PMH/PSH as above who presents s/p mechanical fall    Injuries identified:   - R Femoral neck fx  - T12 compression fx
see below

## 2022-11-12 NOTE — PHYSICAL THERAPY INITIAL EVALUATION ADULT - GENERAL OBSERVATIONS, REHAB EVAL
[phone] Patient encountered semi caceres in bed + IV lock, NAD daughter at  bed side
PT IE 8:30-9am. Patient left in supine in NAD. +call bell, +bed alarm, +prima fit, +bandaging at R LE.

## 2022-11-13 LAB
BASOPHILS # BLD AUTO: 0.04 K/UL — SIGNIFICANT CHANGE UP (ref 0–0.2)
BASOPHILS NFR BLD AUTO: 0.4 % — SIGNIFICANT CHANGE UP (ref 0–1)
EOSINOPHIL # BLD AUTO: 0.18 K/UL — SIGNIFICANT CHANGE UP (ref 0–0.7)
EOSINOPHIL NFR BLD AUTO: 1.7 % — SIGNIFICANT CHANGE UP (ref 0–8)
GLUCOSE BLDC GLUCOMTR-MCNC: 159 MG/DL — HIGH (ref 70–99)
GLUCOSE BLDC GLUCOMTR-MCNC: 181 MG/DL — HIGH (ref 70–99)
GLUCOSE BLDC GLUCOMTR-MCNC: 191 MG/DL — HIGH (ref 70–99)
GLUCOSE BLDC GLUCOMTR-MCNC: 273 MG/DL — HIGH (ref 70–99)
HCT VFR BLD CALC: 26.3 % — LOW (ref 37–47)
HGB BLD-MCNC: 8.9 G/DL — LOW (ref 12–16)
IMM GRANULOCYTES NFR BLD AUTO: 0.8 % — HIGH (ref 0.1–0.3)
LYMPHOCYTES # BLD AUTO: 1.09 K/UL — LOW (ref 1.2–3.4)
LYMPHOCYTES # BLD AUTO: 10.5 % — LOW (ref 20.5–51.1)
MCHC RBC-ENTMCNC: 32.8 PG — HIGH (ref 27–31)
MCHC RBC-ENTMCNC: 33.8 G/DL — SIGNIFICANT CHANGE UP (ref 32–37)
MCV RBC AUTO: 97 FL — SIGNIFICANT CHANGE UP (ref 81–99)
MONOCYTES # BLD AUTO: 1.21 K/UL — HIGH (ref 0.1–0.6)
MONOCYTES NFR BLD AUTO: 11.7 % — HIGH (ref 1.7–9.3)
NEUTROPHILS # BLD AUTO: 7.74 K/UL — HIGH (ref 1.4–6.5)
NEUTROPHILS NFR BLD AUTO: 74.9 % — SIGNIFICANT CHANGE UP (ref 42.2–75.2)
NRBC # BLD: 0 /100 WBCS — SIGNIFICANT CHANGE UP (ref 0–0)
PLATELET # BLD AUTO: 166 K/UL — SIGNIFICANT CHANGE UP (ref 130–400)
RBC # BLD: 2.71 M/UL — LOW (ref 4.2–5.4)
RBC # FLD: 14.1 % — SIGNIFICANT CHANGE UP (ref 11.5–14.5)
SARS-COV-2 RNA SPEC QL NAA+PROBE: SIGNIFICANT CHANGE UP
WBC # BLD: 10.34 K/UL — SIGNIFICANT CHANGE UP (ref 4.8–10.8)
WBC # FLD AUTO: 10.34 K/UL — SIGNIFICANT CHANGE UP (ref 4.8–10.8)

## 2022-11-13 PROCEDURE — 99232 SBSQ HOSP IP/OBS MODERATE 35: CPT

## 2022-11-13 RX ORDER — ALPRAZOLAM 0.25 MG
0.25 TABLET ORAL ONCE
Refills: 0 | Status: DISCONTINUED | OUTPATIENT
Start: 2022-11-13 | End: 2022-11-13

## 2022-11-13 RX ADMIN — Medication 81 MILLIGRAM(S): at 11:20

## 2022-11-13 RX ADMIN — Medication 50 MILLIGRAM(S): at 17:26

## 2022-11-13 RX ADMIN — HEPARIN SODIUM 5000 UNIT(S): 5000 INJECTION INTRAVENOUS; SUBCUTANEOUS at 06:34

## 2022-11-13 RX ADMIN — Medication 50 MILLIGRAM(S): at 06:35

## 2022-11-13 RX ADMIN — Medication 1 PACKET(S): at 01:33

## 2022-11-13 RX ADMIN — Medication 2: at 17:30

## 2022-11-13 RX ADMIN — Medication 650 MILLIGRAM(S): at 11:50

## 2022-11-13 RX ADMIN — Medication 4: at 07:51

## 2022-11-13 RX ADMIN — RANOLAZINE 500 MILLIGRAM(S): 500 TABLET, FILM COATED, EXTENDED RELEASE ORAL at 06:35

## 2022-11-13 RX ADMIN — Medication 20 MILLIGRAM(S): at 06:35

## 2022-11-13 RX ADMIN — HEPARIN SODIUM 5000 UNIT(S): 5000 INJECTION INTRAVENOUS; SUBCUTANEOUS at 21:01

## 2022-11-13 RX ADMIN — Medication 8: at 11:26

## 2022-11-13 RX ADMIN — Medication 650 MILLIGRAM(S): at 17:25

## 2022-11-13 RX ADMIN — Medication 0.25 MILLIGRAM(S): at 20:28

## 2022-11-13 RX ADMIN — Medication 25 GRAM(S): at 01:33

## 2022-11-13 RX ADMIN — CEFTRIAXONE 100 MILLIGRAM(S): 500 INJECTION, POWDER, FOR SOLUTION INTRAMUSCULAR; INTRAVENOUS at 15:43

## 2022-11-13 RX ADMIN — SENNA PLUS 2 TABLET(S): 8.6 TABLET ORAL at 21:02

## 2022-11-13 RX ADMIN — Medication 650 MILLIGRAM(S): at 18:05

## 2022-11-13 RX ADMIN — HEPARIN SODIUM 5000 UNIT(S): 5000 INJECTION INTRAVENOUS; SUBCUTANEOUS at 15:43

## 2022-11-13 RX ADMIN — PANTOPRAZOLE SODIUM 40 MILLIGRAM(S): 20 TABLET, DELAYED RELEASE ORAL at 11:21

## 2022-11-13 RX ADMIN — Medication 650 MILLIGRAM(S): at 11:20

## 2022-11-13 RX ADMIN — Medication 650 MILLIGRAM(S): at 06:35

## 2022-11-13 RX ADMIN — RANOLAZINE 500 MILLIGRAM(S): 500 TABLET, FILM COATED, EXTENDED RELEASE ORAL at 17:25

## 2022-11-13 NOTE — PROGRESS NOTE ADULT - ASSESSMENT
89F w/ HTN, HLD, CAD, DM2 who presents s/p mechanical fall cb Right hip fracture and T12 compression fracture.    #Mechanical fall cb Right hip fracture and T12 compression fracture  s/p Hemiarthroplasty, hip   - Post-op care per surgery team  pain control  DVT PPx   PT/OT > rehab    #HTN/HLD  #CAD  145/76  - Cont lasix 20mg qD  - Cont metoprolol tartrate 50 BID  - Cont ASA 81mg qD  - Cont ranolazine 500 BID    #DM2  - Cont ISS    DVT PPX, heparin SC

## 2022-11-14 LAB
ANION GAP SERPL CALC-SCNC: 18 MMOL/L — HIGH (ref 7–14)
BUN SERPL-MCNC: 34 MG/DL — HIGH (ref 10–20)
CALCIUM SERPL-MCNC: 7.9 MG/DL — LOW (ref 8.4–10.5)
CHLORIDE SERPL-SCNC: 97 MMOL/L — LOW (ref 98–110)
CK MB CFR SERPL CALC: 3.1 NG/ML — SIGNIFICANT CHANGE UP (ref 0.6–6.3)
CK SERPL-CCNC: 514 U/L — HIGH (ref 0–225)
CO2 SERPL-SCNC: 19 MMOL/L — SIGNIFICANT CHANGE UP (ref 17–32)
CREAT SERPL-MCNC: 1.5 MG/DL — SIGNIFICANT CHANGE UP (ref 0.7–1.5)
EGFR: 33 ML/MIN/1.73M2 — LOW
GLUCOSE BLDC GLUCOMTR-MCNC: 160 MG/DL — HIGH (ref 70–99)
GLUCOSE BLDC GLUCOMTR-MCNC: 179 MG/DL — HIGH (ref 70–99)
GLUCOSE BLDC GLUCOMTR-MCNC: 184 MG/DL — HIGH (ref 70–99)
GLUCOSE BLDC GLUCOMTR-MCNC: 217 MG/DL — HIGH (ref 70–99)
GLUCOSE SERPL-MCNC: 178 MG/DL — HIGH (ref 70–99)
MAGNESIUM SERPL-MCNC: 2.2 MG/DL — SIGNIFICANT CHANGE UP (ref 1.8–2.4)
NT-PROBNP SERPL-SCNC: 3517 PG/ML — HIGH (ref 0–300)
PHOSPHATE SERPL-MCNC: 3.2 MG/DL — SIGNIFICANT CHANGE UP (ref 2.1–4.9)
POTASSIUM SERPL-MCNC: 3.8 MMOL/L — SIGNIFICANT CHANGE UP (ref 3.5–5)
POTASSIUM SERPL-SCNC: 3.8 MMOL/L — SIGNIFICANT CHANGE UP (ref 3.5–5)
SODIUM SERPL-SCNC: 134 MMOL/L — LOW (ref 135–146)
TROPONIN T SERPL-MCNC: 0.01 NG/ML — SIGNIFICANT CHANGE UP

## 2022-11-14 PROCEDURE — 99232 SBSQ HOSP IP/OBS MODERATE 35: CPT

## 2022-11-14 PROCEDURE — 71045 X-RAY EXAM CHEST 1 VIEW: CPT | Mod: 26

## 2022-11-14 PROCEDURE — 93010 ELECTROCARDIOGRAM REPORT: CPT

## 2022-11-14 RX ORDER — LABETALOL HCL 100 MG
5 TABLET ORAL ONCE
Refills: 0 | Status: COMPLETED | OUTPATIENT
Start: 2022-11-14 | End: 2022-11-14

## 2022-11-14 RX ORDER — ALPRAZOLAM 0.25 MG
0.25 TABLET ORAL ONCE
Refills: 0 | Status: DISCONTINUED | OUTPATIENT
Start: 2022-11-14 | End: 2022-11-14

## 2022-11-14 RX ORDER — NIFEDIPINE 30 MG
30 TABLET, EXTENDED RELEASE 24 HR ORAL ONCE
Refills: 0 | Status: DISCONTINUED | OUTPATIENT
Start: 2022-11-14 | End: 2022-11-15

## 2022-11-14 RX ORDER — FUROSEMIDE 40 MG
20 TABLET ORAL ONCE
Refills: 0 | Status: COMPLETED | OUTPATIENT
Start: 2022-11-14 | End: 2022-11-14

## 2022-11-14 RX ORDER — NIFEDIPINE 30 MG
30 TABLET, EXTENDED RELEASE 24 HR ORAL DAILY
Refills: 0 | Status: DISCONTINUED | OUTPATIENT
Start: 2022-11-14 | End: 2022-11-14

## 2022-11-14 RX ADMIN — SODIUM CHLORIDE 100 MILLILITER(S): 9 INJECTION, SOLUTION INTRAVENOUS at 05:41

## 2022-11-14 RX ADMIN — Medication 20 MILLIGRAM(S): at 06:33

## 2022-11-14 RX ADMIN — Medication 650 MILLIGRAM(S): at 17:08

## 2022-11-14 RX ADMIN — CEFTRIAXONE 100 MILLIGRAM(S): 500 INJECTION, POWDER, FOR SOLUTION INTRAMUSCULAR; INTRAVENOUS at 14:31

## 2022-11-14 RX ADMIN — RANOLAZINE 500 MILLIGRAM(S): 500 TABLET, FILM COATED, EXTENDED RELEASE ORAL at 17:08

## 2022-11-14 RX ADMIN — Medication 650 MILLIGRAM(S): at 11:23

## 2022-11-14 RX ADMIN — Medication 6: at 17:09

## 2022-11-14 RX ADMIN — Medication 4: at 12:10

## 2022-11-14 RX ADMIN — Medication 650 MILLIGRAM(S): at 05:42

## 2022-11-14 RX ADMIN — RANOLAZINE 500 MILLIGRAM(S): 500 TABLET, FILM COATED, EXTENDED RELEASE ORAL at 05:41

## 2022-11-14 RX ADMIN — HEPARIN SODIUM 5000 UNIT(S): 5000 INJECTION INTRAVENOUS; SUBCUTANEOUS at 05:41

## 2022-11-14 RX ADMIN — Medication 4: at 07:53

## 2022-11-14 RX ADMIN — Medication 81 MILLIGRAM(S): at 11:24

## 2022-11-14 RX ADMIN — HEPARIN SODIUM 5000 UNIT(S): 5000 INJECTION INTRAVENOUS; SUBCUTANEOUS at 14:29

## 2022-11-14 RX ADMIN — Medication 50 MILLIGRAM(S): at 17:08

## 2022-11-14 RX ADMIN — SENNA PLUS 2 TABLET(S): 8.6 TABLET ORAL at 21:51

## 2022-11-14 RX ADMIN — Medication 50 MILLIGRAM(S): at 05:42

## 2022-11-14 RX ADMIN — Medication 0.25 MILLIGRAM(S): at 22:32

## 2022-11-14 RX ADMIN — Medication 5 MILLIGRAM(S): at 22:44

## 2022-11-14 RX ADMIN — HEPARIN SODIUM 5000 UNIT(S): 5000 INJECTION INTRAVENOUS; SUBCUTANEOUS at 21:51

## 2022-11-14 RX ADMIN — Medication 20 MILLIGRAM(S): at 05:42

## 2022-11-14 RX ADMIN — PANTOPRAZOLE SODIUM 40 MILLIGRAM(S): 20 TABLET, DELAYED RELEASE ORAL at 11:23

## 2022-11-14 NOTE — PROGRESS NOTE ADULT - ASSESSMENT
ASSESSMENT:  89 yo female s/p right hip hemiarthroplasty. Overnight course complicated by chest pain. EKG revealed sinus rhythm with first degree AV block.     PLAN:  - Multimodal pain control   - Hemodynamic monitoring   - Chest pain work up: EKG sinus rhythm with first degree AV block, CXR, trop negative, CKMG/CBC w/ diff/BMP/Mag/Phos. Negative   - Disposition to [facility]   - Wean oxygen as tolerated   - WBAT w/ walker   - Mechanical and chemical DVT ppx   - Encourage ambulation, IS, OOBTC   - Maintain UOP > 0.5cc/kg/hr   - Monitor Creatinine   - Replete electrolytes PRN   - Trauma Surgery following, Spectra [phone] for questions or concerns

## 2022-11-14 NOTE — PROGRESS NOTE ADULT - REASON FOR ADMISSION
Patient is a 89y old  Female who presents with a chief complaint of Patient is a 89y old  Female who presents with a chief complaint of (13 Nov 2022 13:51)
Patient is a 89y old  Female who presents with a chief complaint of
no

## 2022-11-14 NOTE — PROGRESS NOTE ADULT - ASSESSMENT
89F w/ HTN, HLD, CAD, DM2 who presents s/p mechanical fall cb Right hip fracture and T12 compression fracture.    #Mechanical fall cb Right hip fracture and T12 compression fracture  s/p Hemiarthroplasty, hip   - Post-op care per surgery team  pain control  DVT PPx   PT/OT > rehab    #Acute Blood Loss Anemia  on admission 12 > Hb 8.9  Maintain type and screen   Transfuse Hb > 8   monitor Hb    #HTN/HLD  #CAD  145/76  Cont lasix 20mg qD  Cont metoprolol tartrate 50 BID  Cont ASA 81mg qD  Cont ranolazine 500 BID  consider nifedipine if need for BP control  TTE Echo Complete w/o Contrast w/ Doppler (11.12.22 @ 12:26)    1. Left ventricular ejection fraction, by visual estimation, is 60 to   65%.   2. Suboptimal LV endocardial visualization. LV systolic function appears   to be preserved. Unable to evaluate for wall motion abnormality. EF   estimated visually. Consider repeat exam with ultrasound enhancing agent.   3. Increased LV wall thickness.   4. Calcified aortic valve with decreased leaflet opening. Suboptimal   Doppler signal. Gradients may be underestimated. Visually AS appears to   be moderate.   5. Technically difficult exam.    F/u TTE with enhacing agent       #DM2  - Cont ISS    DVT PPX, heparin SC

## 2022-11-15 ENCOUNTER — TRANSCRIPTION ENCOUNTER (OUTPATIENT)
Age: 87
End: 2022-11-15

## 2022-11-15 VITALS
RESPIRATION RATE: 18 BRPM | DIASTOLIC BLOOD PRESSURE: 69 MMHG | OXYGEN SATURATION: 98 % | HEART RATE: 68 BPM | TEMPERATURE: 96 F | SYSTOLIC BLOOD PRESSURE: 150 MMHG

## 2022-11-15 LAB
ANION GAP SERPL CALC-SCNC: 12 MMOL/L — SIGNIFICANT CHANGE UP (ref 7–14)
ANION GAP SERPL CALC-SCNC: 13 MMOL/L — SIGNIFICANT CHANGE UP (ref 7–14)
BASOPHILS # BLD AUTO: 0.04 K/UL — SIGNIFICANT CHANGE UP (ref 0–0.2)
BASOPHILS # BLD AUTO: 0.05 K/UL — SIGNIFICANT CHANGE UP (ref 0–0.2)
BASOPHILS NFR BLD AUTO: 0.6 % — SIGNIFICANT CHANGE UP (ref 0–1)
BASOPHILS NFR BLD AUTO: 0.6 % — SIGNIFICANT CHANGE UP (ref 0–1)
BUN SERPL-MCNC: 34 MG/DL — HIGH (ref 10–20)
BUN SERPL-MCNC: 37 MG/DL — HIGH (ref 10–20)
CALCIUM SERPL-MCNC: 7.7 MG/DL — LOW (ref 8.4–10.5)
CALCIUM SERPL-MCNC: 7.7 MG/DL — LOW (ref 8.4–10.5)
CHLORIDE SERPL-SCNC: 101 MMOL/L — SIGNIFICANT CHANGE UP (ref 98–110)
CHLORIDE SERPL-SCNC: 96 MMOL/L — LOW (ref 98–110)
CO2 SERPL-SCNC: 23 MMOL/L — SIGNIFICANT CHANGE UP (ref 17–32)
CO2 SERPL-SCNC: 23 MMOL/L — SIGNIFICANT CHANGE UP (ref 17–32)
CREAT SERPL-MCNC: 1.5 MG/DL — SIGNIFICANT CHANGE UP (ref 0.7–1.5)
CREAT SERPL-MCNC: 1.5 MG/DL — SIGNIFICANT CHANGE UP (ref 0.7–1.5)
EGFR: 33 ML/MIN/1.73M2 — LOW
EGFR: 33 ML/MIN/1.73M2 — LOW
EOSINOPHIL # BLD AUTO: 0.2 K/UL — SIGNIFICANT CHANGE UP (ref 0–0.7)
EOSINOPHIL # BLD AUTO: 0.22 K/UL — SIGNIFICANT CHANGE UP (ref 0–0.7)
EOSINOPHIL NFR BLD AUTO: 2.7 % — SIGNIFICANT CHANGE UP (ref 0–8)
EOSINOPHIL NFR BLD AUTO: 3.1 % — SIGNIFICANT CHANGE UP (ref 0–8)
GLUCOSE BLDC GLUCOMTR-MCNC: 157 MG/DL — HIGH (ref 70–99)
GLUCOSE BLDC GLUCOMTR-MCNC: 192 MG/DL — HIGH (ref 70–99)
GLUCOSE BLDC GLUCOMTR-MCNC: 237 MG/DL — HIGH (ref 70–99)
GLUCOSE BLDC GLUCOMTR-MCNC: 240 MG/DL — HIGH (ref 70–99)
GLUCOSE SERPL-MCNC: 119 MG/DL — HIGH (ref 70–99)
GLUCOSE SERPL-MCNC: 212 MG/DL — HIGH (ref 70–99)
HCT VFR BLD CALC: 21.4 % — LOW (ref 37–47)
HCT VFR BLD CALC: 21.5 % — LOW (ref 37–47)
HGB BLD-MCNC: 7 G/DL — LOW (ref 12–16)
HGB BLD-MCNC: 7.1 G/DL — LOW (ref 12–16)
IMM GRANULOCYTES NFR BLD AUTO: 0.6 % — HIGH (ref 0.1–0.3)
IMM GRANULOCYTES NFR BLD AUTO: 1 % — HIGH (ref 0.1–0.3)
LYMPHOCYTES # BLD AUTO: 0.8 K/UL — LOW (ref 1.2–3.4)
LYMPHOCYTES # BLD AUTO: 1.41 K/UL — SIGNIFICANT CHANGE UP (ref 1.2–3.4)
LYMPHOCYTES # BLD AUTO: 12.4 % — LOW (ref 20.5–51.1)
LYMPHOCYTES # BLD AUTO: 17.1 % — LOW (ref 20.5–51.1)
MAGNESIUM SERPL-MCNC: 1.9 MG/DL — SIGNIFICANT CHANGE UP (ref 1.8–2.4)
MCHC RBC-ENTMCNC: 32.1 PG — HIGH (ref 27–31)
MCHC RBC-ENTMCNC: 32.4 PG — HIGH (ref 27–31)
MCHC RBC-ENTMCNC: 32.7 G/DL — SIGNIFICANT CHANGE UP (ref 32–37)
MCHC RBC-ENTMCNC: 33 G/DL — SIGNIFICANT CHANGE UP (ref 32–37)
MCV RBC AUTO: 98.2 FL — SIGNIFICANT CHANGE UP (ref 81–99)
MCV RBC AUTO: 98.2 FL — SIGNIFICANT CHANGE UP (ref 81–99)
MONOCYTES # BLD AUTO: 0.67 K/UL — HIGH (ref 0.1–0.6)
MONOCYTES # BLD AUTO: 0.92 K/UL — HIGH (ref 0.1–0.6)
MONOCYTES NFR BLD AUTO: 10.4 % — HIGH (ref 1.7–9.3)
MONOCYTES NFR BLD AUTO: 11.2 % — HIGH (ref 1.7–9.3)
NEUTROPHILS # BLD AUTO: 4.7 K/UL — SIGNIFICANT CHANGE UP (ref 1.4–6.5)
NEUTROPHILS # BLD AUTO: 5.55 K/UL — SIGNIFICANT CHANGE UP (ref 1.4–6.5)
NEUTROPHILS NFR BLD AUTO: 67.4 % — SIGNIFICANT CHANGE UP (ref 42.2–75.2)
NEUTROPHILS NFR BLD AUTO: 72.9 % — SIGNIFICANT CHANGE UP (ref 42.2–75.2)
NRBC # BLD: 0 /100 WBCS — SIGNIFICANT CHANGE UP (ref 0–0)
NRBC # BLD: 0 /100 WBCS — SIGNIFICANT CHANGE UP (ref 0–0)
PHOSPHATE SERPL-MCNC: 3.4 MG/DL — SIGNIFICANT CHANGE UP (ref 2.1–4.9)
PLATELET # BLD AUTO: 152 K/UL — SIGNIFICANT CHANGE UP (ref 130–400)
PLATELET # BLD AUTO: 161 K/UL — SIGNIFICANT CHANGE UP (ref 130–400)
POTASSIUM SERPL-MCNC: 3.4 MMOL/L — LOW (ref 3.5–5)
POTASSIUM SERPL-MCNC: 4.1 MMOL/L — SIGNIFICANT CHANGE UP (ref 3.5–5)
POTASSIUM SERPL-SCNC: 3.4 MMOL/L — LOW (ref 3.5–5)
POTASSIUM SERPL-SCNC: 4.1 MMOL/L — SIGNIFICANT CHANGE UP (ref 3.5–5)
RBC # BLD: 2.18 M/UL — LOW (ref 4.2–5.4)
RBC # BLD: 2.19 M/UL — LOW (ref 4.2–5.4)
RBC # FLD: 13.9 % — SIGNIFICANT CHANGE UP (ref 11.5–14.5)
RBC # FLD: 14 % — SIGNIFICANT CHANGE UP (ref 11.5–14.5)
SODIUM SERPL-SCNC: 131 MMOL/L — LOW (ref 135–146)
SODIUM SERPL-SCNC: 137 MMOL/L — SIGNIFICANT CHANGE UP (ref 135–146)
WBC # BLD: 6.45 K/UL — SIGNIFICANT CHANGE UP (ref 4.8–10.8)
WBC # BLD: 8.23 K/UL — SIGNIFICANT CHANGE UP (ref 4.8–10.8)
WBC # FLD AUTO: 6.45 K/UL — SIGNIFICANT CHANGE UP (ref 4.8–10.8)
WBC # FLD AUTO: 8.23 K/UL — SIGNIFICANT CHANGE UP (ref 4.8–10.8)

## 2022-11-15 PROCEDURE — 99232 SBSQ HOSP IP/OBS MODERATE 35: CPT

## 2022-11-15 RX ORDER — NIFEDIPINE 30 MG
1 TABLET, EXTENDED RELEASE 24 HR ORAL
Qty: 0 | Refills: 0 | DISCHARGE
Start: 2022-11-15

## 2022-11-15 RX ORDER — NIFEDIPINE 30 MG
1 TABLET, EXTENDED RELEASE 24 HR ORAL
Qty: 30 | Refills: 0
Start: 2022-11-15 | End: 2022-12-14

## 2022-11-15 RX ORDER — POTASSIUM CHLORIDE 20 MEQ
20 PACKET (EA) ORAL ONCE
Refills: 0 | Status: DISCONTINUED | OUTPATIENT
Start: 2022-11-15 | End: 2022-11-15

## 2022-11-15 RX ORDER — INSULIN LISPRO 100/ML
VIAL (ML) SUBCUTANEOUS
Refills: 0 | Status: DISCONTINUED | OUTPATIENT
Start: 2022-11-15 | End: 2022-11-15

## 2022-11-15 RX ORDER — INSULIN HUMAN 100 [IU]/ML
5 INJECTION, SOLUTION SUBCUTANEOUS ONCE
Refills: 0 | Status: COMPLETED | OUTPATIENT
Start: 2022-11-15 | End: 2022-11-15

## 2022-11-15 RX ORDER — NIFEDIPINE 30 MG
60 TABLET, EXTENDED RELEASE 24 HR ORAL DAILY
Refills: 0 | Status: DISCONTINUED | OUTPATIENT
Start: 2022-11-15 | End: 2022-11-15

## 2022-11-15 RX ORDER — ASPIRIN/CALCIUM CARB/MAGNESIUM 324 MG
1 TABLET ORAL
Qty: 0 | Refills: 0 | DISCHARGE
Start: 2022-11-15

## 2022-11-15 RX ORDER — ASPIRIN/CALCIUM CARB/MAGNESIUM 324 MG
1 TABLET ORAL
Qty: 0 | Refills: 0 | DISCHARGE

## 2022-11-15 RX ADMIN — Medication 650 MILLIGRAM(S): at 05:27

## 2022-11-15 RX ADMIN — Medication 81 MILLIGRAM(S): at 11:47

## 2022-11-15 RX ADMIN — RANOLAZINE 500 MILLIGRAM(S): 500 TABLET, FILM COATED, EXTENDED RELEASE ORAL at 17:23

## 2022-11-15 RX ADMIN — Medication 650 MILLIGRAM(S): at 17:23

## 2022-11-15 RX ADMIN — PANTOPRAZOLE SODIUM 40 MILLIGRAM(S): 20 TABLET, DELAYED RELEASE ORAL at 11:48

## 2022-11-15 RX ADMIN — RANOLAZINE 500 MILLIGRAM(S): 500 TABLET, FILM COATED, EXTENDED RELEASE ORAL at 05:27

## 2022-11-15 RX ADMIN — INSULIN HUMAN 5 UNIT(S): 100 INJECTION, SOLUTION SUBCUTANEOUS at 16:58

## 2022-11-15 RX ADMIN — HEPARIN SODIUM 5000 UNIT(S): 5000 INJECTION INTRAVENOUS; SUBCUTANEOUS at 05:27

## 2022-11-15 RX ADMIN — Medication 60 MILLIGRAM(S): at 11:58

## 2022-11-15 RX ADMIN — Medication 2: at 08:11

## 2022-11-15 RX ADMIN — Medication 650 MILLIGRAM(S): at 11:47

## 2022-11-15 RX ADMIN — Medication 20 MILLIGRAM(S): at 05:24

## 2022-11-15 RX ADMIN — HEPARIN SODIUM 5000 UNIT(S): 5000 INJECTION INTRAVENOUS; SUBCUTANEOUS at 15:53

## 2022-11-15 RX ADMIN — Medication 8: at 15:56

## 2022-11-15 RX ADMIN — Medication 50 MILLIGRAM(S): at 17:26

## 2022-11-15 RX ADMIN — Medication 6: at 12:33

## 2022-11-15 RX ADMIN — Medication 50 MILLIGRAM(S): at 05:27

## 2022-11-15 NOTE — DISCHARGE NOTE NURSING/CASE MANAGEMENT/SOCIAL WORK - NSDCPEFALRISK_GEN_ALL_CORE
For information on Fall & Injury Prevention, visit: [email or web address] OR  [email or web address] OR  https://www.cdc.gov/steadi/patient.html

## 2022-11-15 NOTE — DISCHARGE NOTE PROVIDER - CARE PROVIDER_API CALL
Glynn Branham)  Orthopaedic Surgery  [address] [place] [address]  Phone: [phone]  Fax: [phone]  Follow Up Time: 2 weeks

## 2022-11-15 NOTE — PROVIDER CONTACT NOTE (OTHER) - DATE AND TIME:
14-Nov-2022 18:46
14-Nov-2022 22:27
15-Nov-2022 05:20
15-Nov-2022 06:23
14-Nov-2022 02:20
15-Nov-2022 16:31

## 2022-11-15 NOTE — DISCHARGE NOTE PROVIDER - NSDCCPCAREPLAN_GEN_ALL_CORE_FT
PRINCIPAL DISCHARGE DIAGNOSIS  Diagnosis: Fall  Assessment and Plan of Treatment: Activity: Weight baring as tolerated. Please use TLSO or abdominal brace when sitting up in bed.   Pain control: You may take over-the-counter tylenol and motrin three times per day with food for up to 3 days.   Medications: Please continue Aspirin 325mg QD for 6 days for DVT ppx. Then you may continue your home dose ASA 81.   We added nifedipine to your blood pressure medication management due to hypertensive episodes during your hospital admission. Please follow up with your primary care regarding blood pressure management.   Follow up: Please call the number provided to make an appointment with Dr. Chris Zacarias and trauma clinic in 1-2 weeks. Please call with any questions or concerns including fevers, worsening pain, pus from the wounds, or redness of the skin.      SECONDARY DISCHARGE DIAGNOSES  Diagnosis: Fracture of femoral neck, right, closed  Assessment and Plan of Treatment:     Diagnosis: Vertebral compression fracture  Assessment and Plan of Treatment:

## 2022-11-15 NOTE — PROVIDER CONTACT NOTE (OTHER) - SITUATION
patient already received dose of metoprolol. BP still high
pt states she wants xanax, pt bp 183/72, hr 60
pt bp 115/60, hr 66. due for metoprolol
pt bp 159/69, hr 73
pt c/o chest pain , bp-188/74 hr 90 , pulse ox-88% RA

## 2022-11-15 NOTE — DISCHARGE NOTE NURSING/CASE MANAGEMENT/SOCIAL WORK - NSDCVIVACCINE_GEN_ALL_CORE_FT
Tdap; 10-Nov-2022 22:45; Kateryna Montemayor (RN); [employer]; [id number] (Exp. Date: 14-Oct-2024); IntraMuscular; Deltoid Right.; 0.5 milliLiter(s); VIS (VIS Published: 09-May-2013, VIS Presented: 10-Nov-2022);

## 2022-11-15 NOTE — DISCHARGE NOTE PROVIDER - NSFOLLOWUPCLINICS_GEN_ALL_ED_FT
[facility]  Trauma Surgery  [address] [place] [address]  Phone: [phone]  Fax:   Follow Up Time: 2 weeks

## 2022-11-15 NOTE — PROGRESS NOTE ADULT - ASSESSMENT
`89 yo female s/p right hip hemiarthroplasty. Overnight course complicated by chest pain. EKG revealed sinus rhythm with first degree AV block.     PLAN:  -Monitor HGB  - Multimodal pain control   - Hemodynamic monitoring   - Chest pain work up: EKG sinus rhythm with first degree AV block, CXR, trop negative, CKMG/CBC w/ diff/BMP/Mag/Phos. Negative   - Disposition to [facility]   - Wean oxygen as tolerated   - WBAT w/ walker   - Mechanical and chemical DVT ppx   - Encourage ambulation, IS, OOBTC   - Maintain UOP > 0.5cc/kg/hr   - Monitor Creatinine   - Replete electrolytes PRN   - Trauma Surgery following, Spectra [phone] for questions or concerns

## 2022-11-15 NOTE — DISCHARGE NOTE PROVIDER - HOSPITAL COURSE
89 yo female, PMHx of DM, CAD, DLD, HTN, presents s/p mechanical fall onto her right side, complaining of right hip, non-radiating, constant, sharp, no alleviating factors, aggravated by movement, no associated symptoms, sudden onset 6:30 pm at NH when she was walking without her walker and she tripped. She also hit her head. Denies headache, LOC, AC use, nausea, vomiting, dizziness, chest pain, shortness of breath, abdominal pain. PANSCAN reveals acute subcapital impacted and angulated right femoral neck fracture. Acute compression fracture of T12. Patient went to OR with ortho for right hip hemiarthroplasty. POD3, patient developed shortness of breath and chest pain. She was desaturating to 88% and was started on 2L NC. EKG revealed sinus rhythm with first degree AV block. CXR taken as well. Troponins were negative, CKMB 513, BNP >3500. CBC w/ diff, BMP, Mag, and Phos reviewed as well. Patient stated that she was feeling very anxious as her blood pressure was elevated. She was kept on 2L NC and weaned off in the morning. Patient is now sating 95% on RA. Pain is controlled. PT and physiatry recommended rehab back at [facility]. Ortho recommended DVT ppx with outpatient follow up. Blood pressure medications were adjusted in the hospital, and patient will need to follow up with primary care regarding blood pressure. Patient will be discharged to [facility] with PT.

## 2022-11-15 NOTE — PROVIDER CONTACT NOTE (OTHER) - ACTION/TREATMENT ORDERED:
Cardiac enzymes drawn, EKG ordered , cxr ordered, 2lnc; pulse ox -100%.
MD Palma made aware. MD to order xanax
MD made aware. MD said to hold and recheck bp in one hour
MD made aware. MD said to give metoprolol.
will order IV push insulin
will reorder patient's nifedipine

## 2022-11-15 NOTE — PROGRESS NOTE ADULT - PROVIDER SPECIALTY LIST ADULT
Hospitalist
Orthopedics
Orthopedics
Trauma Surgery
Trauma Surgery
Internal Medicine
Orthopedics
Orthopedics
Trauma Surgery
Trauma Surgery
Internal Medicine

## 2022-11-15 NOTE — DISCHARGE NOTE NURSING/CASE MANAGEMENT/SOCIAL WORK - PATIENT PORTAL LINK FT
You can access the FollowMyHealth Patient Portal offered by [facility] by registering at the following website: [email or web address]. By joining [employer]’s FollowMyHealth portal, you will also be able to view your health information using other applications (apps) compatible with our system.

## 2022-11-15 NOTE — PROGRESS NOTE ADULT - ATTENDING COMMENTS
Trauma/ACS Attending  Note Attestation    Patient is examined and evaluated at the bedside with the residents/PAs. Treatment plan discussed with the team, nurses, and consulting physicians and consulting teams. Medications, radiological studies and all other relevant studies reviewed.     SCOTT BUCKLEY Patient is a 89y old  Female who presents with a chief complaint of fall and sustained hip fracture S/P ORIF    Vital Signs Last 24 Hrs  T(C): 35.7 (15 Nov 2022 16:25), Max: 36.6 (15 Nov 2022 09:31)  T(F): 96.3 (15 Nov 2022 16:25), Max: 97.8 (15 Nov 2022 09:31)  HR: 68 (15 Nov 2022 16:25) (60 - 85)  BP: 150/69 (15 Nov 2022 16:25) (115/60 - 188/77)  BP(mean): --  RR: 18 (15 Nov 2022 16:25) (18 - 18)  SpO2: 98% (15 Nov 2022 16:25) (95% - 100%)    Parameters below as of 15 Nov 2022 12:11  Patient On (Oxygen Delivery Method): room air                            7.1    6.45  )-----------( 152      ( 15 Nov 2022 11:42 )             21.5   11-15    137  |  101  |  37<H>  ----------------------------<  212<H>  4.1   |  23  |  1.5    Ca    7.7<L>      15 Nov 2022 11:42  Phos  3.4     11-15  Mg     1.9     11-15        Diagnosis: hip fracture    Plan:	  - supportive care  - regular diet  - pain management  - incentive spirometer   - DVT prophylaxis       [x ] SCDs [x ] Lovenox [ ] Heparins SQ  [ ] None  - GI prophylaxis  - follow up consults  - repeat studies as needed  - PT evaluation and follow up  - replace electrolytes  - case management evaluation     Bette Shipman MD, FACS  Trauma/ACS/Surgical Critical Care Attending
Trauma/ACS Attending  Note Attestation    Patient is examined and evaluated at the bedside with the residents/PAs. Treatment plan discussed with the team, nurses, and consulting physicians and consulting teams. Medications, radiological studies and all other relevant studies reviewed.     SCOTT BUCKLEY Patient is a 89y old  Female who presents with a chief complaint of fall and sustained hip fracture S/P ORIF    Vital Signs Last 24 Hrs  T(C): 36.9 (12 Nov 2022 07:53), Max: 37.6 (11 Nov 2022 13:44)  T(F): 98.5 (12 Nov 2022 07:53), Max: 99.6 (11 Nov 2022 13:44)  HR: 65 (12 Nov 2022 07:53) (56 - 91)  BP: 135/57 (12 Nov 2022 07:53) (126/56 - 200/76)  BP(mean): 105 (11 Nov 2022 16:42) (105 - 105)  RR: 18 (12 Nov 2022 07:53) (17 - 20)  SpO2: 95% (12 Nov 2022 07:53) (93% - 100%)    Parameters below as of 11 Nov 2022 18:30  Patient On (Oxygen Delivery Method): room air                        9.5    10.71 )-----------( 137      ( 12 Nov 2022 07:45 )             28.8     11-12    144  |  106  |  35<H>  ----------------------------<  205<H>  4.7   |  22  |  1.9<H>    Ca    8.1<L>      12 Nov 2022 07:45  Phos  3.1     11-12  Mg     1.7     11-12    TPro  6.9  /  Alb  4.2  /  TBili  0.2  /  DBili  x   /  AST  19  /  ALT  9   /  AlkPhos  84  11-10      Diagnosis: hip fracture    Plan:	  - supportive care  - pain management  - incentive spirometer   - DVT prophylaxis       [x ] SCDs [x ] Lovenox [ ] Heparins SQ  [ ] None  - GI prophylaxis  - follow up consults  - repeat studies as needed  - PT evaluation and follow up  - replace electrolytes  - case management evaluation     Bette Shipman MD, FACS  Trauma/ACS/Surgical Critical Care Attending
rehab    oob
Trauma/ACS Attending  Note Attestation    Patient is examined and evaluated at the bedside with the residents/PAs. Treatment plan discussed with the team, nurses, and consulting physicians and consulting teams. Medications, radiological studies and all other relevant studies reviewed.     SCOTT BUCKLEY Patient is a 89y old  Female who presents with a chief complaint of fall and sustained hip fracture S/P ORIF    Vital Signs Last 24 Hrs  T(C): 36.6 (14 Nov 2022 16:14), Max: 37.3 (14 Nov 2022 05:00)  T(F): 97.9 (14 Nov 2022 16:14), Max: 99.1 (14 Nov 2022 05:00)  HR: 85 (14 Nov 2022 17:15) (62 - 90)  BP: 188/77 (14 Nov 2022 17:15) (140/61 - 188/77)  BP(mean): --  RR: 18 (14 Nov 2022 16:14) (18 - 18)  SpO2: 97% (14 Nov 2022 16:14) (93% - 100%)    Parameters below as of 14 Nov 2022 16:14  Patient On (Oxygen Delivery Method): room air                        8.9    10.34 )-----------( 166      ( 13 Nov 2022 21:18 )             26.3   11-13    134<L>  |  97<L>  |  34<H>  ----------------------------<  178<H>  3.8   |  19  |  1.5    Ca    7.9<L>      13 Nov 2022 21:18  Phos  3.2     11-13  Mg     2.2     11-13      Diagnosis: hip fracture    Plan:	  - supportive care  - regular diet  - pain management  - incentive spirometer   - DVT prophylaxis       [x ] SCDs [x ] Lovenox [ ] Heparins SQ  [ ] None  - GI prophylaxis  - follow up consults  - repeat studies as needed  - PT evaluation and follow up  - replace electrolytes  - case management evaluation     Bette Shipman MD, FACS  Trauma/ACS/Surgical Critical Care Attending
rehab   oob  discharge planning

## 2022-11-15 NOTE — DISCHARGE NOTE PROVIDER - NSDCMRMEDTOKEN_GEN_ALL_CORE_FT
Aspirin Enteric Coated 325 mg oral delayed release tablet: 1 tab(s) orally once a day for 6 weeks  furosemide 20 mg oral tablet: 1 tab(s) orally once a day  gemfibrozil 600 mg oral tablet: 1 tab(s) orally 2 times a day  glipiZIDE 10 mg oral tablet: 1 tab(s) orally once a day  metFORMIN 500 mg oral tablet: 1 tab(s) orally 2 times a day  metoprolol tartrate 50 mg oral tablet: 1 tab(s) orally 2 times a day  nabumetone 750 mg oral tablet: 2 tab(s) orally once a day  NIFEdipine 60 mg oral tablet, extended release: 1 tab(s) orally once a day  pravastatin 40 mg oral tablet: 1 tab(s) orally once a day  Protonix 40 mg oral granule, delayed release: 1 each orally once a day  Ranexa 500 mg oral tablet, extended release: 1 tab(s) orally 2 times a day

## 2022-11-15 NOTE — PROVIDER CONTACT NOTE (OTHER) - REASON
Patient's fingerstick is 237 after 8 units of insulin
pt c/o chest pain
pt wants xanax, pt bp
pt repeat bp
pt vitals
bp 188/77

## 2022-11-15 NOTE — PROGRESS NOTE ADULT - SUBJECTIVE AND OBJECTIVE BOX
GENERAL SURGERY PROGRESS NOTE    Patient: SCOTT BUCKLEY , 89y ([birth year])Female   MRN: [id number]  Location: [address]  Visit: 11-10-22 Inpatient  Date: 11-13-22 @ 11:30    Hospital Day #: 3  Post-Op Day #: 2    Procedure/Dx/Injuries: S/P right hip hemiarthroplasty 11/11    Events of past 24 hours: Patient was anxious and stated she has not slept in three nights. Patient reports taking Xanax .25 regularly. She was given .25 Xanax after a trial of melatonin did not assist her in getting rest. She reported minor pain. She ambulated 3 steps with PT. Magnesium repleted for Mg less that 2. KPhos powder for K less than 4 and Phos less than 3.    PAST MEDICAL & SURGICAL HISTORY:  Diabetes      CAD (coronary artery disease)      Hypertension      Hyperlipidemia          Vitals:   T(F): 97.7 (11-13-22 @ 09:01), Max: 98.3 (11-13-22 @ 01:00)  HR: 61 (11-13-22 @ 09:01)  BP: 147/62 (11-13-22 @ 09:01)  RR: 18 (11-13-22 @ 09:01)  SpO2: 97% (11-13-22 @ 09:01)      Diet, Regular      Fluids: lactated ringers.: Solution, 1000 milliLiter(s) infuse at 100 mL/Hr  Provider's Contact #: [phone]      I & O's:    11-12-22 @ 07:01  -  11-13-22 @ 07:00  --------------------------------------------------------  IN:    Lactated Ringers: 400 mL  Total IN: 400 mL    OUT:    Voided (mL): 700 mL  Total OUT: 700 mL    Total NET: -300 mL    PHYSICAL EXAM:  General: NAD, AAOx3, calm and cooperative  Cardiac: RRR S1, S2  Respiratory: Normal respiratory effort  Abdomen: Soft, non-distended, non-tender  Skin: Warm/dry, normal color, no jaundice  Incision/wound: healing well, dressings in place, clean, dry and intact    MEDICATIONS  (STANDING):  acetaminophen     Tablet .. 650 milliGRAM(s) Oral every 6 hours  aspirin enteric coated 81 milliGRAM(s) Oral daily  cefTRIAXone   IVPB 1000 milliGRAM(s) IV Intermittent every 24 hours  dextrose 50% Injectable 25 Gram(s) IV Push once  dextrose 50% Injectable 12.5 Gram(s) IV Push once  dextrose 50% Injectable 25 Gram(s) IV Push once  furosemide    Tablet 20 milliGRAM(s) Oral daily  glucagon  Injectable 1 milliGRAM(s) IntraMuscular once  heparin   Injectable 5000 Unit(s) SubCutaneous every 8 hours  insulin lispro (ADMELOG) corrective regimen sliding scale   SubCutaneous three times a day before meals  lactated ringers. 1000 milliLiter(s) (100 mL/Hr) IV Continuous <Continuous>  lidocaine   4% Patch 1 Patch Transdermal every 24 hours  metoprolol tartrate 50 milliGRAM(s) Oral two times a day  pantoprazole   Suspension 40 milliGRAM(s) Oral daily  ranolazine 500 milliGRAM(s) Oral two times a day  senna 2 Tablet(s) Oral at bedtime    MEDICATIONS  (PRN):  dextrose Oral Gel 15 Gram(s) Oral once PRN Blood Glucose LESS THAN 70 milliGRAM(s)/deciliter  ondansetron Injectable 4 milliGRAM(s) IV Push every 6 hours PRN Nausea and/or Vomiting      DVT PROPHYLAXIS: heparin   Injectable 5000 Unit(s) SubCutaneous every 8 hours    GI PROPHYLAXIS: pantoprazole   Suspension 40 milliGRAM(s) Oral daily    ANTICOAGULATION:   ANTIBIOTICS:  cefTRIAXone   IVPB 1000 milliGRAM(s)            LAB/STUDIES:  Labs:  CAPILLARY BLOOD GLUCOSE      POCT Blood Glucose.: 273 mg/dL (13 Nov 2022 11:24)  POCT Blood Glucose.: 191 mg/dL (13 Nov 2022 07:32)  POCT Blood Glucose.: 158 mg/dL (12 Nov 2022 21:02)  POCT Blood Glucose.: 200 mg/dL (12 Nov 2022 17:18)  POCT Blood Glucose.: 120 mg/dL (12 Nov 2022 11:31)                          9.7    9.44  )-----------( 140      ( 12 Nov 2022 20:38 )             29.6       Auto Neutrophil %: 74.9 % (11-12-22 @ 20:38)  Auto Immature Granulocyte %: 0.4 % (11-12-22 @ 20:38)    11-12    134<L>  |  97<L>  |  36<H>  ----------------------------<  150<H>  3.6   |  22  |  1.8<H>      Calcium, Total Serum: 8.3 mg/dL (11-12-22 @ 20:38)      LFTs:     Lactate, Blood: 1.5 mmol/L (11-10-22 @ 20:22)    IMAGING:  < from: US Kidney and Bladder (11.12.22 @ 13:30) >  IMPRESSION:    No hydronephrosis    --- End of Report ---    < end of copied text >  < from: Xray Chest 1 View- PORTABLE-Routine (Xray Chest 1 View- PORTABLE-Routine in AM.) (11.12.22 @ 05:32) >  Impression:  Low lung volume without definite evidence of focal consolidation pleural   effusion or pneumothorax.            --- End of Report ---    < end of copied text >    · Assessment	  89 yo female s/p right hip hemiarthroplasty    PLAN:  - Pain control  - WBAT w/ walker  - DVT ppx  - IS, ambulation, OOB  - Dispo planning  - Pt/Physiatry     [phone]
Orthopaedic Surgery Progress Note    S&E at bedside this AM. Pain well controlled. Denies fever/chills/CP/SOB. No other complaints      T(C): 35.9 (11-15-22 @ 05:18), Max: 36.6 (11-14-22 @ 09:51)  HR: 73 (11-15-22 @ 06:18) (60 - 85)  BP: 159/69 (11-15-22 @ 06:18) (115/60 - 188/77)  RR: 18 (11-15-22 @ 05:18) (18 - 18)  SpO2: 95% (11-15-22 @ 05:18) (95% - 98%)    P/E:  Alert, NAD  Nonlabored breathing    RLE  Dressing changed today   SILT distally   Firing ta/ehl/fhl/gs  Foot WWP, 2+ DP pulse    Labs                        7.0    8.23  )-----------( 161      ( 15 Nov 2022 00:44 )             21.4     11-15    131<L>  |  96<L>  |  34<H>  ----------------------------<  119<H>  3.4<L>   |  23  |  1.5    Ca    7.7<L>      15 Nov 2022 00:44  Phos  3.4     11-15  Mg     1.9     11-15      89yo Female s/p right hemiarthroplasty POD4    Weightbearing: WBAT RLE with walker  DVT ppx  Pain control  Incentive spirometer  Home meds  Trend labs  PT/OT/Rehab  Dispo: f/u PT recommendations    - If discharged, patient should follow up with Dr. Chris Zacarias at [address] [facility] phone [phone].  - If discharged recommend discharge on anticoagulation, such as Aspirin 325 mg QD for 6 weeks in duration. Please contact the Orthopaedic Surgery service with any questions on recommended DVT prophylaxis given hip fractures have high rates of post-operative thromboembolism.               
  SCOTT BUCKLEY  89y, Female  Allergy: codeine (Unknown)    Hospital Day: 1d    Patient seen and examined. No acute events overnight    PMH/PSH:  PAST MEDICAL & SURGICAL HISTORY:  Diabetes      CAD (coronary artery disease)      Hypertension      Hyperlipidemia          VITALS:  T(F): 98.2 (22 @ 08:58), Max: 98.2 (22 @ 08:58)  HR: 63 (22 @ 08:58)  BP: 189/80 (22 @ 08:58) (166/66 - 189/80)  RR: 18 (22 @ 08:58)  SpO2: 99% (22 @ 08:58)    TESTS & MEASUREMENTS:  Weight (Kg): 72.6 (11-10-22 @ 20:53)                            12.1   6.75  )-----------( 172      ( 10 Nov 2022 20:22 )             36.9     PT/INR - ( 10 Nov 2022 20:22 )   PT: 12.10 sec;   INR: 1.06 ratio         PTT - ( 10 Nov 2022 20:22 )  PTT:30.4 sec  11-10    135  |  99  |  34<H>  ----------------------------<  246<H>  4.7   |  24  |  1.2    Ca    9.0      10 Nov 2022 20:22    TPro  6.9  /  Alb  4.2  /  TBili  0.2  /  DBili  x   /  AST  19  /  ALT  9   /  AlkPhos  84  11-10    LIVER FUNCTIONS - ( 10 Nov 2022 20:22 )  Alb: 4.2 g/dL / Pro: 6.9 g/dL / ALK PHOS: 84 U/L / ALT: 9 U/L / AST: 19 U/L / GGT: x                 Urinalysis Basic - ( 10 Nov 2022 22:56 )    Color: Light Yellow / Appearance: Clear / S.028 / pH: x  Gluc: x / Ketone: Negative  / Bili: Negative / Urobili: <2 mg/dL   Blood: x / Protein: 30 mg/dL / Nitrite: Negative   Leuk Esterase: Large / RBC: 3 /HPF / WBC 9 /HPF   Sq Epi: x / Non Sq Epi: 4 /HPF / Bacteria: Many        RADIOLOGY & ADDITIONAL TESTS:    RECENT DIAGNOSTIC ORDERS:  Xray Chest 1 View- PORTABLE-Routine: AM   Indication: preop  Transport: Portable (22 @ 02:06)  Diet, NPO:   Except Medications     Special Instructions for Nursing:  Except Medications (22 @ 01:59)  Diet, NPO:   NPO for Procedure/Test     NPO Start Date: 2022,   NPO Start Time: 00:00  Except Medications (22 @ 00:28)  ABO Rh Confirmatory Specimen: STAT  Addl Info: Conditional: ABO Rh Confirmatory Specimen (11-10-22 @ 21:32)  COVID-19 PCR: STAT  Specimen Source: Nasopharyngeal (11-10-22 @ 19:52)      MEDICATIONS:  MEDICATIONS  (STANDING):  acetaminophen     Tablet .. 650 milliGRAM(s) Oral every 6 hours  aspirin enteric coated 81 milliGRAM(s) Oral daily  dextrose 5%. 1000 milliLiter(s) (50 mL/Hr) IV Continuous <Continuous>  dextrose 5%. 1000 milliLiter(s) (100 mL/Hr) IV Continuous <Continuous>  dextrose 50% Injectable 25 Gram(s) IV Push once  dextrose 50% Injectable 12.5 Gram(s) IV Push once  dextrose 50% Injectable 25 Gram(s) IV Push once  furosemide    Tablet 20 milliGRAM(s) Oral daily  glucagon  Injectable 1 milliGRAM(s) IntraMuscular once  heparin   Injectable 5000 Unit(s) SubCutaneous every 8 hours  insulin lispro (ADMELOG) corrective regimen sliding scale   SubCutaneous three times a day before meals  lactated ringers. 1000 milliLiter(s) (75 mL/Hr) IV Continuous <Continuous>  lidocaine   4% Patch 1 Patch Transdermal every 24 hours  metoprolol tartrate 50 milliGRAM(s) Oral two times a day  pantoprazole   Suspension 40 milliGRAM(s) Oral daily  ranolazine 500 milliGRAM(s) Oral two times a day  senna 2 Tablet(s) Oral at bedtime    MEDICATIONS  (PRN):  dextrose Oral Gel 15 Gram(s) Oral once PRN Blood Glucose LESS THAN 70 milliGRAM(s)/deciliter      HOME MEDICATIONS:  Aspir 81 oral delayed release tablet ()  furosemide 20 mg oral tablet ()  gemfibrozil 600 mg oral tablet ()  glipiZIDE 10 mg oral tablet ()  metFORMIN 500 mg oral tablet ()  metoprolol tartrate 50 mg oral tablet ()  nabumetone 750 mg oral tablet ()  pravastatin 40 mg oral tablet ()  Protonix 40 mg oral granule, delayed release ()  Ranexa 500 mg oral tablet, extended release ()      REVIEW OF SYSTEMS:  All other review of systems is negative unless indicated above.     PHYSICAL EXAM:  PHYSICAL EXAM:  GENERAL: NAD, well-developed  HEAD:  Atraumatic, Normocephalic  NECK: Supple, No JVD  CHEST/LUNG: Clear to auscultation bilaterally; No wheeze  HEART: Regular rate and rhythm; No murmurs, rubs, or gallops  ABDOMEN: Soft, Nontender, Nondistended; Bowel sounds present  EXTREMITIES:  2+ Peripheral Pulses, No clubbing, cyanosis, or edema  SKIN: No rashes or lesions      
Orthopaedic Surgery Progress Note    S&E at bedside this AM. Pain well controlled. No complaints at this time.     T(C): 36.5 (11-13-22 @ 05:00), Max: 36.9 (11-12-22 @ 07:53)  HR: 73 (11-13-22 @ 05:00) (65 - 94)  BP: 147/61 (11-13-22 @ 05:00) (135/57 - 152/62)  RR: 18 (11-13-22 @ 05:00) (18 - 18)  SpO2: 95% (11-13-22 @ 05:00) (93% - 95%)    P/E:  Alert, NAD  Nonlabored breathing    RLE  Dressing in place, c/d/i  SILT distally   Firing ta/ehl/fhl/gs  Foot WWP, 2+ DP pulse    Labs                        9.7    9.44  )-----------( 140      ( 12 Nov 2022 20:38 )             29.6     11-12    134<L>  |  97<L>  |  36<H>  ----------------------------<  150<H>  3.6   |  22  |  1.8<H>    Ca    8.3<L>      12 Nov 2022 20:38  Phos  2.7     11-12  Mg     1.6     11-12      A/P:   89yo Female s/p right hemiarthroplasty POD2    Weightbearing: WBAT RLE with walker  DVT ppx  Pain control  Incentive spirometer  Home meds  Trend labs  PT/OT/Rehab  Dispo: f/u PT recommendations    - If discharged, patient should follow up with Dr. Chris Zacarias at [address] [facility] phone [phone].  - If discharged recommend discharge on anticoagulation, such as Aspirin 325 mg QD for 6 weeks in duration. Please contact the Orthopaedic Surgery service with any questions on recommended DVT prophylaxis given hip fractures have high rates of post-operative thromboembolism.           
SCOTT BUCKLEY  89y, Female  Allergy: codeine (Unknown)    Hospital Day: 4d    Patient seen and examined earlier today.  No acute events overnight. reports pain in the right hip.     ROS: negative except as noted above    PMH/PSH:  PAST MEDICAL & SURGICAL HISTORY:  Diabetes      CAD (coronary artery disease)      Hypertension      Hyperlipidemia          LAST 24-Hr EVENTS:    VITALS:  T(F): 97.9 (11-14-22 @ 09:51), Max: 99.1 (11-14-22 @ 05:00)  HR: 72 (11-14-22 @ 09:51)  BP: 144/65 (11-14-22 @ 09:51) (140/60 - 188/74)  RR: 18 (11-14-22 @ 09:51)  SpO2: 98% (11-14-22 @ 09:51)          TESTS & MEASUREMENTS:  Weight/BMI  72.6 (11-11-22 @ 16:42)    11-12-22 @ 07:01  -  11-13-22 @ 07:00  --------------------------------------------------------  IN: 400 mL / OUT: 700 mL / NET: -300 mL    11-13-22 @ 07:01  -  11-14-22 @ 07:00  --------------------------------------------------------  IN: 1100 mL / OUT: 1200 mL / NET: -100 mL                            8.9    10.34 )-----------( 166      ( 13 Nov 2022 21:18 )             26.3       INR: 1.06 ratio (11-10-22 @ 20:22)    11-13    134<L>  |  97<L>  |  34<H>  ----------------------------<  178<H>  3.8   |  19  |  1.5    Ca    7.9<L>      13 Nov 2022 21:18  Phos  3.2     11-13  Mg     2.2     11-13        CARDIAC MARKERS ( 14 Nov 2022 02:55 )  x     / 0.01 ng/mL / 514 U/L / x     / 3.1 ng/mL                Serum Pro-Brain Natriuretic Peptide: 3517 pg/mL (11-14-22 @ 02:55)    COVID-19 PCR: NotDetec (11-12-22 @ 20:30)  COVID-19 PCR: NotDetec (11-11-22 @ 14:00)                RADIOLOGY, ECG, & ADDITIONAL TESTS:  12 Lead ECG:   Ventricular Rate 78 BPM    Atrial Rate 78 BPM    P-R Interval 230 ms    QRS Duration 84 ms    Q-T Interval 394 ms    QTC Calculation(Bazett) 449 ms    P Axis 40 degrees    R Axis -17 degrees    T Axis -6 degrees    Diagnosis Line Sinus rhythm with 1st degree A-V block  Minimal voltage criteria for LVH, may be normal variant ( R in aVL )  Anterior infarct , age undetermined  Abnormal ECG    Confirmed by tomeka molina ([id number]) on 11/11/2022 5:59:07 AM (11-11-22 @ 02:19)    CT Head No Cont:   ACC: [id number] EXAM:  CT CERVICAL SPINE                        ACC: [id number] EXAM:  CT BRAIN                          PROCEDURE DATE:  11/10/2022          INTERPRETATION:  Indication: Head injury. Neck injury. Trauma to neck.   Trauma to head.    Technique: CT head without IV contrast, and CT cervical spine without IV   contrast performed. Multiple axial CT images of the head were obtained   from the base of the skull to the vertex without the administration of IV   contrast. Subsequently, multiple axial CT images of the cervical spine   were obtained with coronal and sagittal reformats.    Comparison: CT head 8/3/2018.    Findings:    Images of cervical spine degraded by motion.    CT head findings:    Ventricular system, basal cisterns andsulcal patterns are symmetric and   appropriate for patient's age.    No acute extra-axial collection.  No mass effect, midline shift or acute   hemorrhage is seen.    Patchy hypodensities in the periventricular and subcortical white matter   withoutmass effect compatible with chronic microvascular changes.    No depressed skull fracture.    Paranasal sinuses and mastoid air cells are well-aerated.    CT cervical spine findings:    No acute fracture.  Vertebral body heights grossly preserved.  No   spondylolisthesis.    Dens is intact.  No prevertebral soft tissue swelling.    Multilevel degenerative change.    Impression:  CT head:  No evidence of acute intracranial abnormality.  CT cervical spine:  No evidence of acute fracture of the cervical spine.    --- End of Report ---            RAMU AHN MD; Attending Radiologist  This document has been electronically signed. Nov 10 2022 10:01PM (11-10-22 @ 21:56)    RECENT DIAGNOSTIC ORDERS:  TTE Echo Complete w/ Contrast w/ Doppler:   Transport: Stretcher-Crib  Monitor: w/o Monitor  Provider's Contact #: [phone] (11-14-22 @ 14:03)  Phosphorus Level, Serum: 20:00 (11-14-22 @ 08:20)  Magnesium, Serum: 20:00 (11-14-22 @ 08:20)  Complete Blood Count + Automated Diff: 20:00 (11-14-22 @ 08:20)  Basic Metabolic Panel: 20:00 (11-14-22 @ 08:20)  Surgical Pathology Report: 17:36 (11-14-22 @ 07:54)  12 Lead ECG (11-14-22 @ 02:31)      MEDICATIONS:  MEDICATIONS  (STANDING):  acetaminophen     Tablet .. 650 milliGRAM(s) Oral every 6 hours  aspirin enteric coated 81 milliGRAM(s) Oral daily  cefTRIAXone   IVPB 1000 milliGRAM(s) IV Intermittent every 24 hours  dextrose 50% Injectable 25 Gram(s) IV Push once  dextrose 50% Injectable 12.5 Gram(s) IV Push once  dextrose 50% Injectable 25 Gram(s) IV Push once  furosemide    Tablet 20 milliGRAM(s) Oral daily  glucagon  Injectable 1 milliGRAM(s) IntraMuscular once  heparin   Injectable 5000 Unit(s) SubCutaneous every 8 hours  insulin lispro (ADMELOG) corrective regimen sliding scale   SubCutaneous three times a day before meals  lidocaine   4% Patch 1 Patch Transdermal every 24 hours  metoprolol tartrate 50 milliGRAM(s) Oral two times a day  pantoprazole   Suspension 40 milliGRAM(s) Oral daily  ranolazine 500 milliGRAM(s) Oral two times a day  senna 2 Tablet(s) Oral at bedtime    MEDICATIONS  (PRN):  dextrose Oral Gel 15 Gram(s) Oral once PRN Blood Glucose LESS THAN 70 milliGRAM(s)/deciliter  ondansetron Injectable 4 milliGRAM(s) IV Push every 6 hours PRN Nausea and/or Vomiting      HOME MEDICATIONS:  Aspir 81 oral delayed release tablet (08-03)  furosemide 20 mg oral tablet (08-03)  gemfibrozil 600 mg oral tablet (08-03)  glipiZIDE 10 mg oral tablet (08-03)  metFORMIN 500 mg oral tablet (08-03)  metoprolol tartrate 50 mg oral tablet (08-03)  nabumetone 750 mg oral tablet (08-03)  pravastatin 40 mg oral tablet (08-03)  Protonix 40 mg oral granule, delayed release (08-03)  Ranexa 500 mg oral tablet, extended release (08-03)      PHYSICAL EXAM:  GENERAL: NAD, well-developed  HEAD:  Atraumatic, Normocephalic  NECK: Supple, No JVD  CHEST/LUNG: Clear to auscultation bilaterally; No wheeze  HEART: Regular rate and rhythm; No murmurs, rubs, or gallops  ABDOMEN: Soft, Nontender, Nondistended; Bowel sounds present  EXTREMITIES:  2+ Peripheral Pulses, No clubbing, cyanosis, or edema  SKIN: No rashes or lesions         
GENERAL SURGERY PROGRESS NOTE    Patient: SCOTT BUCKLEY , 89y ([birth year])Female   MRN: [id number]  Location: [address]  Visit: 11-10-22 Inpatient  Date: 11-14-22 @ 08:55    Patient seen and evaluated at bedside. Molst form was completemed 11/13/22. She is laying comfortably in bed during day and tolerating diet. She is OOBTC and denies nausea, vomiting, fever, or chills. Daughter present at bedside.     Overnight, patient developed shortness of breath and chest pain. She was desaturating to 88% and was started on 2L NC. EKG revealed sinus rhythm with first degree AV block. CXR taken as well. Troponins were negative, CKMB 513, BNP >3500. CBC w/ diff, BMP, Mag, and Phos reviewed as well. Patient stated that she was feeling very anxious as her blood pressure was elevated. She was kept on 2L NC and weaned off in the morning.     PAST MEDICAL & SURGICAL HISTORY:  Diabetes      CAD (coronary artery disease)      Hypertension      Hyperlipidemia          Vitals:   T(F): 99.1 (11-14-22 @ 05:00), Max: 99.1 (11-14-22 @ 05:00)  HR: 85 (11-14-22 @ 05:00)  BP: 153/60 (11-14-22 @ 05:00)  RR: 18 (11-14-22 @ 05:00)  SpO2: 100% (11-14-22 @ 05:00)      Diet, Regular      Fluids:     I & O's:    11-13-22 @ 07:01  -  11-14-22 @ 07:00  --------------------------------------------------------  IN:    Lactated Ringers: 1100 mL  Total IN: 1100 mL    OUT:    Voided (mL): 1200 mL  Total OUT: 1200 mL    Total NET: -100 mL    PHYSICAL EXAM:  General: NAD, AAO, calm and cooperative  HEENT: NCAT  Cardiac: No peripheral cyanosis or pallor, extremities well perfused   Respiratory: Non-labored breathing, equal chest rise bilaterally   Abdomen: Soft, non-distended, non-tender, no rebound, no guarding  Musculoskeletal: Strength 5/5 BL UE/LE, ROM intact, compartments soft  Neuro: Sensation grossly intact and equal throughout, no focal deficits  Vascular: Pulses 2+ throughout, extremities well perfused  Skin: Warm/dry, normal color, no jaundice  Incision/wound: healing well, dressings in place, clean, dry and intact      MEDICATIONS  (STANDING):  acetaminophen     Tablet .. 650 milliGRAM(s) Oral every 6 hours  aspirin enteric coated 81 milliGRAM(s) Oral daily  cefTRIAXone   IVPB 1000 milliGRAM(s) IV Intermittent every 24 hours  dextrose 50% Injectable 25 Gram(s) IV Push once  dextrose 50% Injectable 12.5 Gram(s) IV Push once  dextrose 50% Injectable 25 Gram(s) IV Push once  furosemide    Tablet 20 milliGRAM(s) Oral daily  glucagon  Injectable 1 milliGRAM(s) IntraMuscular once  heparin   Injectable 5000 Unit(s) SubCutaneous every 8 hours  insulin lispro (ADMELOG) corrective regimen sliding scale   SubCutaneous three times a day before meals  lidocaine   4% Patch 1 Patch Transdermal every 24 hours  metoprolol tartrate 50 milliGRAM(s) Oral two times a day  pantoprazole   Suspension 40 milliGRAM(s) Oral daily  ranolazine 500 milliGRAM(s) Oral two times a day  senna 2 Tablet(s) Oral at bedtime    MEDICATIONS  (PRN):  dextrose Oral Gel 15 Gram(s) Oral once PRN Blood Glucose LESS THAN 70 milliGRAM(s)/deciliter  ondansetron Injectable 4 milliGRAM(s) IV Push every 6 hours PRN Nausea and/or Vomiting      DVT PROPHYLAXIS: heparin   Injectable 5000 Unit(s) SubCutaneous every 8 hours    GI PROPHYLAXIS: pantoprazole   Suspension 40 milliGRAM(s) Oral daily    ANTICOAGULATION:   ANTIBIOTICS:  cefTRIAXone   IVPB 1000 milliGRAM(s)    LAB/STUDIES:  Labs:  CAPILLARY BLOOD GLUCOSE      POCT Blood Glucose.: 184 mg/dL (14 Nov 2022 07:46)  POCT Blood Glucose.: 181 mg/dL (13 Nov 2022 20:58)  POCT Blood Glucose.: 159 mg/dL (13 Nov 2022 16:42)  POCT Blood Glucose.: 273 mg/dL (13 Nov 2022 11:24)                          8.9    10.34 )-----------( 166      ( 13 Nov 2022 21:18 )             26.3       Auto Neutrophil %: 74.9 % (11-13-22 @ 21:18)  Auto Immature Granulocyte %: 0.8 % (11-13-22 @ 21:18)    11-13    134<L>  |  97<L>  |  34<H>  ----------------------------<  178<H>  3.8   |  19  |  1.5      Calcium, Total Serum: 7.9 mg/dL (11-13-22 @ 21:18)      LFTs:         Coags:    CARDIAC MARKERS ( 14 Nov 2022 02:55 )  x     / 0.01 ng/mL / 514 U/L / x     / 3.1 ng/mL      Serum Pro-Brain Natriuretic Peptide: 3517 pg/mL (11-14-22 @ 02:55)      IMAGING:      ACCESS/ DEVICES:  [x] Peripheral IV  [ ] Central Venous Line	[ ] R	[ ] L	[ ] IJ	[ ] Fem	[ ] SC	Placed:   [ ] Arterial Line		[ ] R	[ ] L	[ ] Fem	[ ] Rad	[ ] Ax	Placed:   [ ] PICC:					[ ] Mediport  [ ] Urinary Catheter,  Date Placed:   [ ] Chest tube: [ ] Right, [ ] Left  [ ] VALERIE/Brandon Drains        
GENERAL SURGERY PROGRESS NOTE    Patient: SCOTT BUCKLEY , 89y ([birth year])Female   MRN: [id number]  Location: [address]  Visit: 11-10-22 Inpatient  Date: 11-15-22 @ 03:36    Hospital Day #: 5  Post-Op Day #: 4    Procedure/Dx/Injuries: Right femoral neck fracture    Events of past 24 hours: Awaiting disposition to [facility]. Hgb 7.0 from 8.8. Awaiting TTE    PAST MEDICAL & SURGICAL HISTORY:  Diabetes  CAD (coronary artery disease)  Hypertension  Hyperlipidemia      Vitals:   T(F): 96.8 (11-15-22 @ 00:16), Max: 99.1 (11-14-22 @ 05:00)  HR: 65 (11-15-22 @ 00:16)  BP: 159/69 (11-15-22 @ 00:16)  RR: 18 (11-15-22 @ 00:16)  SpO2: 95% (11-15-22 @ 00:16)      Diet, Regular  Fluids:   I & O's:  11-13-22 @ 07:01  -  11-14-22 @ 07:00  --------------------------------------------------------  IN:    Lactated Ringers: 1100 mL  Total IN: 1100 mL    OUT:    Voided (mL): 1200 mL  Total OUT: 1200 mL    Total NET: -100 mL    PHYSICAL EXAM:  General: NAD, AAO, calm and cooperative  HEENT: NCAT  Cardiac: No peripheral cyanosis or pallor, extremities well perfused   Respiratory: Non-labored breathing, equal chest rise bilaterally   Abdomen: Soft, non-distended, non-tender, no rebound, no guarding  Musculoskeletal: Strength 5/5 BL UE/LE, ROM intact, compartments soft  Neuro: Sensation grossly intact and equal throughout, no focal deficits  Vascular: Pulses 2+ throughout, extremities well perfused  Skin: Warm/dry, normal color, no jaundice  Incision/wound: healing well, dressings in place, clean, dry and intact    MEDICATIONS  (STANDING):  acetaminophen     Tablet .. 650 milliGRAM(s) Oral every 6 hours  aspirin enteric coated 81 milliGRAM(s) Oral daily  dextrose 50% Injectable 25 Gram(s) IV Push once  dextrose 50% Injectable 12.5 Gram(s) IV Push once  dextrose 50% Injectable 25 Gram(s) IV Push once  furosemide    Tablet 20 milliGRAM(s) Oral daily  glucagon  Injectable 1 milliGRAM(s) IntraMuscular once  heparin   Injectable 5000 Unit(s) SubCutaneous every 8 hours  insulin lispro (ADMELOG) corrective regimen sliding scale   SubCutaneous three times a day before meals  lidocaine   4% Patch 1 Patch Transdermal every 24 hours  metoprolol tartrate 50 milliGRAM(s) Oral two times a day  NIFEdipine XL 30 milliGRAM(s) Oral once  pantoprazole   Suspension 40 milliGRAM(s) Oral daily  ranolazine 500 milliGRAM(s) Oral two times a day  senna 2 Tablet(s) Oral at bedtime    MEDICATIONS  (PRN):  dextrose Oral Gel 15 Gram(s) Oral once PRN Blood Glucose LESS THAN 70 milliGRAM(s)/deciliter  ondansetron Injectable 4 milliGRAM(s) IV Push every 6 hours PRN Nausea and/or Vomiting    DVT PROPHYLAXIS: heparin   Injectable 5000 Unit(s) SubCutaneous every 8 hours  GI PROPHYLAXIS: pantoprazole   Suspension 40 milliGRAM(s) Oral daily      LAB/STUDIES:  Labs:  CAPILLARY BLOOD GLUCOSE      POCT Blood Glucose.: 160 mg/dL (14 Nov 2022 21:09)  POCT Blood Glucose.: 217 mg/dL (14 Nov 2022 16:58)  POCT Blood Glucose.: 179 mg/dL (14 Nov 2022 11:45)  POCT Blood Glucose.: 184 mg/dL (14 Nov 2022 07:46)                          7.0    8.23  )-----------( 161      ( 15 Nov 2022 00:44 )             21.4       Auto Neutrophil %: 67.4 % (11-15-22 @ 00:44)  Auto Immature Granulocyte %: 1.0 % (11-15-22 @ 00:44)    11-15    131<L>  |  96<L>  |  34<H>  ----------------------------<  119<H>  3.4<L>   |  23  |  1.5    Calcium, Total Serum: 7.7 mg/dL (11-15-22 @ 00:44)      Coags:    CARDIAC MARKERS ( 14 Nov 2022 02:55 )  x     / 0.01 ng/mL / 514 U/L / x     / 3.1 ng/mL    Serum Pro-Brain Natriuretic Peptide: 3517 pg/mL (11-14-22 @ 02:55)    IMAGING:  n/a    ACCESS/ DEVICES:  [x ] Peripheral IV        
GENERAL SURGERY PROGRESS NOTE    Patient: SCOTT BUCKLEY , 89y (31)Female   MRN: [id number]  Location: [address]  Visit: 11-10-22 Inpatient  Date: 22 @ 04:58    PAST MEDICAL & SURGICAL HISTORY:  Diabetes  CAD (coronary artery disease)  Hypertension  Hyperlipidemia    Vitals:   T(F): 97.7 (22 @ 01:22), Max: 99.6 (22 @ 13:44)  HR: 56 (22 @ 01:22)  BP: 126/56 (22 @ 01:22)  RR: 18 (22 @ 01:22)  SpO2: 93% (22 @ 01:22)    Diet, Regular    Fluids: sodium chloride 0.9%.: Solution, 1000 milliLiter(s) infuse at 75 mL/Hr, Stop After 12 Hours  Provider's Contact #: [phone]    I & O's:    PHYSICAL EXAM:  General: NAD, AAOx3, calm and cooperative  Cardiac: RRR S1, S2  Respiratory: normal respiratory effort  Abdomen: Soft, non-distended, non-tender  Skin: Warm/dry, normal color, no jaundice  Incision/wound: healing well, dressings in place, clean, dry and intact    MEDICATIONS  (STANDING):  acetaminophen     Tablet .. 650 milliGRAM(s) Oral every 6 hours  aspirin enteric coated 81 milliGRAM(s) Oral daily  ceFAZolin   IVPB 2000 milliGRAM(s) IV Intermittent once  dextrose 50% Injectable 25 Gram(s) IV Push once  dextrose 50% Injectable 12.5 Gram(s) IV Push once  dextrose 50% Injectable 25 Gram(s) IV Push once  furosemide    Tablet 20 milliGRAM(s) Oral daily  glucagon  Injectable 1 milliGRAM(s) IntraMuscular once  heparin   Injectable 5000 Unit(s) SubCutaneous every 8 hours  insulin lispro (ADMELOG) corrective regimen sliding scale   SubCutaneous three times a day before meals  lidocaine   4% Patch 1 Patch Transdermal every 24 hours  metoprolol tartrate 50 milliGRAM(s) Oral two times a day  pantoprazole   Suspension 40 milliGRAM(s) Oral daily  ranolazine 500 milliGRAM(s) Oral two times a day  senna 2 Tablet(s) Oral at bedtime  sodium chloride 0.9%. 1000 milliLiter(s) (75 mL/Hr) IV Continuous <Continuous>    MEDICATIONS  (PRN):  dextrose Oral Gel 15 Gram(s) Oral once PRN Blood Glucose LESS THAN 70 milliGRAM(s)/deciliter    DVT PROPHYLAXIS: heparin   Injectable 5000 Unit(s) SubCutaneous every 8 hours    GI PROPHYLAXIS: pantoprazole   Suspension 40 milliGRAM(s) Oral daily    ANTICOAGULATION:   ANTIBIOTICS:  ceFAZolin   IVPB 2000 milliGRAM(s)    LAB/STUDIES:  Labs:  CAPILLARY BLOOD GLUCOSE    POCT Blood Glucose.: 340 mg/dL (2022 20:38)  POCT Blood Glucose.: 187 mg/dL (2022 12:05)  POCT Blood Glucose.: 215 mg/dL (2022 08:28)                        9.8    10.76 )-----------( 153      ( 2022 00:29 )             29.5        143  |  106  |  33<H>  ----------------------------<  223<H>  4.3   |  21  |  1.6<H>    Calcium, Total Serum: 8.2 mg/dL (22 @ 00:29)    LFTs:             6.9  | 0.2  | 19       ------------------[84      ( 10 Nov 2022 20:22 )  4.2  | x    | 9           Lipase:53     Amylase:x         Lactate, Blood: 1.5 mmol/L (11-10-22 @ 20:22)    Coags:     12.10  ----< 1.06    ( 10 Nov 2022 20:22 )     30.4      Urinalysis Basic - ( 10 Nov 2022 22:56 )    Color: Light Yellow / Appearance: Clear / S.028 / pH: x  Gluc: x / Ketone: Negative  / Bili: Negative / Urobili: <2 mg/dL   Blood: x / Protein: 30 mg/dL / Nitrite: Negative   Leuk Esterase: Large / RBC: 3 /HPF / WBC 9 /HPF   Sq Epi: x / Non Sq Epi: 4 /HPF / Bacteria: Many            
s/p right hip jovita pod 0   pt seen at bedside no complaints pain well controlled     Vital Signs Last 24 Hrs  T(C): 36.5 (11 Nov 2022 21:03), Max: 37.6 (11 Nov 2022 13:44)  T(F): 97.7 (11 Nov 2022 21:03), Max: 99.6 (11 Nov 2022 13:44)  HR: 72 (11 Nov 2022 21:03) (61 - 91)  BP: 135/64 (11 Nov 2022 21:03) (135/64 - 200/76)  BP(mean): 105 (11 Nov 2022 16:42) (95 - 115)  RR: 18 (11 Nov 2022 21:03) (17 - 20)  SpO2: 98% (11 Nov 2022 21:03) (95% - 100%)               right hip: aquacell dressing in place c/d/i   nvid df/pf intact   calf soft nt   alise and ipc in place b/l 
s/p right hip jovita pod 1  pt seen at bedside no complaints pain controlled     Vital Signs Last 24 Hrs  T(C): 36.8 (12 Nov 2022 05:28), Max: 37.6 (11 Nov 2022 13:44)  T(F): 98.2 (12 Nov 2022 05:28), Max: 99.6 (11 Nov 2022 13:44)  HR: 66 (12 Nov 2022 05:28) (56 - 91)  BP: 142/59 (12 Nov 2022 05:28) (126/56 - 200/76)  BP(mean): 105 (11 Nov 2022 16:42) (105 - 115)  RR: 18 (12 Nov 2022 05:28) (17 - 20)  SpO2: 94% (12 Nov 2022 05:28) (93% - 100%)                          9.8    10.76 )-----------( 153      ( 12 Nov 2022 00:29 )             29.5     right hip:   aquacell dressing in place c/d/i   nvid   calf soft nt   ipc in place b/l 
BUCKLEYSCOTT LOZANO  89y, Female  Allergy: codeine (Unknown)    Hospital Day: 3d    Patient seen and examined earlier today.  No acute events overnight.  reports having pain in the hip. requesting for pain medications.      ROS: negative except as noted above    PMH/PSH:  PAST MEDICAL & SURGICAL HISTORY:  Diabetes      CAD (coronary artery disease)      Hypertension      Hyperlipidemia          LAST 24-Hr EVENTS:    VITALS:  T(F): 98.9 (11-13-22 @ 12:14), Max: 98.9 (11-13-22 @ 12:14)  HR: 76 (11-13-22 @ 12:14)  BP: 145/76 (11-13-22 @ 12:14) (140/61 - 152/62)  RR: 18 (11-13-22 @ 12:14)  SpO2: 96% (11-13-22 @ 12:14)          TESTS & MEASUREMENTS:  Weight/BMI  72.6 (11-11-22 @ 16:42)    11-11-22 @ 07:01  -  11-12-22 @ 07:00  --------------------------------------------------------  IN: 0 mL / OUT: 50 mL / NET: -50 mL    11-12-22 @ 07:01  -  11-13-22 @ 07:00  --------------------------------------------------------  IN: 400 mL / OUT: 700 mL / NET: -300 mL                            9.7    9.44  )-----------( 140      ( 12 Nov 2022 20:38 )             29.6       INR: 1.06 ratio (11-10-22 @ 20:22)    11-12    134<L>  |  97<L>  |  36<H>  ----------------------------<  150<H>  3.6   |  22  |  1.8<H>    Ca    8.3<L>      12 Nov 2022 20:38  Phos  2.7     11-12  Mg     1.6     11-12                        COVID-19 PCR: NotDetec (11-12-22 @ 20:30)  COVID-19 PCR: NotDetec (11-11-22 @ 14:00)                RADIOLOGY, ECG, & ADDITIONAL TESTS:  12 Lead ECG:   Ventricular Rate 78 BPM    Atrial Rate 78 BPM    P-R Interval 230 ms    QRS Duration 84 ms    Q-T Interval 394 ms    QTC Calculation(Bazett) 449 ms    P Axis 40 degrees    R Axis -17 degrees    T Axis -6 degrees    Diagnosis Line Sinus rhythm with 1st degree A-V block  Minimal voltage criteria for LVH, may be normal variant ( R in aVL )  Anterior infarct , age undetermined  Abnormal ECG    Confirmed by tomeka molina ([id number]) on 11/11/2022 5:59:07 AM (11-11-22 @ 02:19)    CT Head No Cont:   ACC: [id number] EXAM:  CT CERVICAL SPINE                        ACC: [id number] EXAM:  CT BRAIN                          PROCEDURE DATE:  11/10/2022          INTERPRETATION:  Indication: Head injury. Neck injury. Trauma to neck.   Trauma to head.    Technique: CT head without IV contrast, and CT cervical spine without IV   contrast performed. Multiple axial CT images of the head were obtained   from the base of the skull to the vertex without the administration of IV   contrast. Subsequently, multiple axial CT images of the cervical spine   were obtained with coronal and sagittal reformats.    Comparison: CT head 8/3/2018.    Findings:    Images of cervical spine degraded by motion.    CT head findings:    Ventricular system, basal cisterns andsulcal patterns are symmetric and   appropriate for patient's age.    No acute extra-axial collection.  No mass effect, midline shift or acute   hemorrhage is seen.    Patchy hypodensities in the periventricular and subcortical white matter   withoutmass effect compatible with chronic microvascular changes.    No depressed skull fracture.    Paranasal sinuses and mastoid air cells are well-aerated.    CT cervical spine findings:    No acute fracture.  Vertebral body heights grossly preserved.  No   spondylolisthesis.    Dens is intact.  No prevertebral soft tissue swelling.    Multilevel degenerative change.    Impression:  CT head:  No evidence of acute intracranial abnormality.  CT cervical spine:  No evidence of acute fracture of the cervical spine.    --- End of Report ---            RAMU AHN MD; Attending Radiologist  This document has been electronically signed. Nov 10 2022 10:01PM (11-10-22 @ 21:56)    RECENT DIAGNOSTIC ORDERS:  Phosphorus Level, Serum: 20:00 (11-13-22 @ 11:47)  Magnesium, Serum: 20:00 (11-13-22 @ 11:47)  Complete Blood Count + Automated Diff: 20:00 (11-13-22 @ 11:47)  Basic Metabolic Panel: 20:00 (11-13-22 @ 11:47)      MEDICATIONS:  MEDICATIONS  (STANDING):  acetaminophen     Tablet .. 650 milliGRAM(s) Oral every 6 hours  aspirin enteric coated 81 milliGRAM(s) Oral daily  cefTRIAXone   IVPB 1000 milliGRAM(s) IV Intermittent every 24 hours  dextrose 50% Injectable 25 Gram(s) IV Push once  dextrose 50% Injectable 12.5 Gram(s) IV Push once  dextrose 50% Injectable 25 Gram(s) IV Push once  furosemide    Tablet 20 milliGRAM(s) Oral daily  glucagon  Injectable 1 milliGRAM(s) IntraMuscular once  heparin   Injectable 5000 Unit(s) SubCutaneous every 8 hours  insulin lispro (ADMELOG) corrective regimen sliding scale   SubCutaneous three times a day before meals  lactated ringers. 1000 milliLiter(s) (100 mL/Hr) IV Continuous <Continuous>  lidocaine   4% Patch 1 Patch Transdermal every 24 hours  metoprolol tartrate 50 milliGRAM(s) Oral two times a day  pantoprazole   Suspension 40 milliGRAM(s) Oral daily  ranolazine 500 milliGRAM(s) Oral two times a day  senna 2 Tablet(s) Oral at bedtime    MEDICATIONS  (PRN):  dextrose Oral Gel 15 Gram(s) Oral once PRN Blood Glucose LESS THAN 70 milliGRAM(s)/deciliter  ondansetron Injectable 4 milliGRAM(s) IV Push every 6 hours PRN Nausea and/or Vomiting      HOME MEDICATIONS:  Aspir 81 oral delayed release tablet (08-03)  furosemide 20 mg oral tablet (08-03)  gemfibrozil 600 mg oral tablet (08-03)  glipiZIDE 10 mg oral tablet (08-03)  metFORMIN 500 mg oral tablet (08-03)  metoprolol tartrate 50 mg oral tablet (08-03)  nabumetone 750 mg oral tablet (08-03)  pravastatin 40 mg oral tablet (08-03)  Protonix 40 mg oral granule, delayed release (08-03)  Ranexa 500 mg oral tablet, extended release (08-03)      PHYSICAL EXAM:  GENERAL: NAD, well-developed  HEAD:  Atraumatic, Normocephalic  NECK: Supple, No JVD  CHEST/LUNG: Clear to auscultation bilaterally; No wheeze  HEART: Regular rate and rhythm; No murmurs, rubs, or gallops  ABDOMEN: Soft, Nontender, Nondistended; Bowel sounds present  EXTREMITIES:  2+ Peripheral Pulses, No clubbing, cyanosis, or edema  SKIN: No rashes or lesions

## 2022-11-16 LAB — GLUCOSE BLDC GLUCOMTR-MCNC: 268 MG/DL — HIGH (ref 70–99)

## 2022-11-17 LAB — SURGICAL PATHOLOGY STUDY: SIGNIFICANT CHANGE UP

## 2022-11-21 DIAGNOSIS — S72.001A FRACTURE OF UNSPECIFIED PART OF NECK OF RIGHT FEMUR, INITIAL ENCOUNTER FOR CLOSED FRACTURE: ICD-10-CM

## 2022-11-21 DIAGNOSIS — I25.10 ATHEROSCLEROTIC HEART DISEASE OF NATIVE CORONARY ARTERY WITHOUT ANGINA PECTORIS: ICD-10-CM
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
## 2022-11-21 DIAGNOSIS — E66.9 OBESITY, UNSPECIFIED: ICD-10-CM

## 2022-11-21 DIAGNOSIS — E11.9 TYPE 2 DIABETES MELLITUS WITHOUT COMPLICATIONS: ICD-10-CM

## 2022-11-21 DIAGNOSIS — Z79.82 LONG TERM (CURRENT) USE OF ASPIRIN: ICD-10-CM

## 2022-11-21 DIAGNOSIS — D62 ACUTE POSTHEMORRHAGIC ANEMIA: ICD-10-CM

## 2022-11-21 DIAGNOSIS — I10 ESSENTIAL (PRIMARY) HYPERTENSION: ICD-10-CM

## 2022-11-21 DIAGNOSIS — S22.088A OTHER FRACTURE OF T11-T12 VERTEBRA, INITIAL ENCOUNTER FOR CLOSED FRACTURE: ICD-10-CM
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
## 2022-11-21 DIAGNOSIS — Z88.5 ALLERGY STATUS TO NARCOTIC AGENT: ICD-10-CM

## 2022-11-21 DIAGNOSIS — I44.0 ATRIOVENTRICULAR BLOCK, FIRST DEGREE: ICD-10-CM

## 2022-11-21 DIAGNOSIS — E78.5 HYPERLIPIDEMIA, UNSPECIFIED: ICD-10-CM

## 2022-11-21 DIAGNOSIS — Z79.84 LONG TERM (CURRENT) USE OF ORAL HYPOGLYCEMIC DRUGS: ICD-10-CM

## 2023-01-24 ENCOUNTER — APPOINTMENT (OUTPATIENT)
Dept: ORTHOPEDIC SURGERY | Facility: ASSISTED LIVING FACILITY | Age: 88
End: 2023-01-24
Payer: MEDICARE

## 2023-01-24 PROCEDURE — 99024 POSTOP FOLLOW-UP VISIT: CPT

## 2025-02-05 NOTE — PHYSICAL THERAPY INITIAL EVALUATION ADULT - LONG TERM MEMORY, REHAB EVAL
Pt called asking to speak to nurse regarding OT.    Please advise [phone]    
Received call from pt advising yesterday she notified PT she did not need to move forward with OT evaluation but after speaking with her family she would like to proceed. This writer provided phone number to [facility]  [phone] for patient to call and this writer will route to [facility] as well.  Pt advised to call back with any difficulties, pt verbalized understanding and agreeable to the plan.    [facility] - Pt would like to follow through with OT evaluation which was ordered.  
intact